# Patient Record
Sex: FEMALE | Race: WHITE | NOT HISPANIC OR LATINO | Employment: STUDENT | URBAN - METROPOLITAN AREA
[De-identification: names, ages, dates, MRNs, and addresses within clinical notes are randomized per-mention and may not be internally consistent; named-entity substitution may affect disease eponyms.]

---
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
## 2023-01-04 ENCOUNTER — PATIENT MESSAGE (OUTPATIENT)
Dept: PSYCHIATRY | Facility: CLINIC | Age: 18
End: 2023-01-04
Payer: COMMERCIAL

## 2023-01-05 ENCOUNTER — OFFICE VISIT (OUTPATIENT)
Dept: PSYCHIATRY | Facility: CLINIC | Age: 18
End: 2023-01-05
Payer: COMMERCIAL

## 2023-01-05 DIAGNOSIS — Z13.39 ADHD (ATTENTION DEFICIT HYPERACTIVITY DISORDER) EVALUATION: Primary | ICD-10-CM

## 2023-01-05 DIAGNOSIS — F90.9 ATTENTION DEFICIT HYPERACTIVITY DISORDER (ADHD), UNSPECIFIED ADHD TYPE: ICD-10-CM

## 2023-01-05 PROCEDURE — 96139 PR PSYCH/NEUROPSYCH TEST ADMIN/SCORING, BY TECH, 2+ TESTS, EA ADDTL 30 MIN: ICD-10-PCS | Mod: 95,,, | Performed by: PSYCHOLOGIST

## 2023-01-05 PROCEDURE — 96139 PSYCL/NRPSYC TST TECH EA: CPT | Mod: 95,,, | Performed by: PSYCHOLOGIST

## 2023-01-05 PROCEDURE — 90791 PSYCH DIAGNOSTIC EVALUATION: CPT | Mod: 95,,, | Performed by: PSYCHOLOGIST

## 2023-01-05 PROCEDURE — 96131 PSYCL TST EVAL PHYS/QHP EA: CPT | Mod: 95,,, | Performed by: PSYCHOLOGIST

## 2023-01-05 PROCEDURE — 90791 PR PSYCHIATRIC DIAGNOSTIC EVALUATION: ICD-10-PCS | Mod: 95,,, | Performed by: PSYCHOLOGIST

## 2023-01-05 PROCEDURE — 96138 PSYCL/NRPSYC TECH 1ST: CPT | Mod: 95,,, | Performed by: PSYCHOLOGIST

## 2023-01-05 PROCEDURE — 96138 PR PSYCH/NEUROPSYCH TEST ADMIN/SCORING, BY TECH, 2+ TESTS, 1ST 30 MIN: ICD-10-PCS | Mod: 95,,, | Performed by: PSYCHOLOGIST

## 2023-01-05 PROCEDURE — 96131 PR PSYCHOLOGIC TEST EVAL SVCS, EA ADDTL HR: ICD-10-PCS | Mod: 95,,, | Performed by: PSYCHOLOGIST

## 2023-01-05 PROCEDURE — 96130 PR PSYCHOLOGIC TEST EVAL SVCS, 1ST HR: ICD-10-PCS | Mod: 95,,, | Performed by: PSYCHOLOGIST

## 2023-01-05 PROCEDURE — 96130 PSYCL TST EVAL PHYS/QHP 1ST: CPT | Mod: 95,,, | Performed by: PSYCHOLOGIST

## 2023-01-05 NOTE — LETTER
January 10, 2023        Dept. Of Psychiatry             [facility] [address] [address]  [address]  [facility] [address]  Phone: [phone]   Patient: Laura Erazo   MR Number: [id number]   YOB: 2005   Date of Visit: 1/5/2023       Dear Dr. Gallagher:    Thank you for referring Laura Erazo to me for evaluation. Below are the relevant portions of my assessment and plan of care.    Based on this evaluation, Ms. Erazo will be assigned a diagnosis of Other Specified ADHD (or ADHD, unspecified). Her testing scores and reports indicated inconsistent evidence that is insufficient to support a full diagnosis of ADHD. However, she does appear to have symptoms related to this ADHD and has a strong family history of this disorder as well. Her current symptoms may also be exacerbated by issues in adjusting to nursing school, living situation, and distance from her friends in college. While she denied significant distress and impairment currently from this adjustment, it may be helpful to keep assessing her symptoms in this area given her past diagnosis of Adjustment Disorder.    Testing suggests that Ms. Erazo's biggest areas of concern include inattention and vigilance, which may explain her difficulty in attending to classes and studying. Because her coursework is likely to increase in difficulty and require more studying, it is recommended she consider ADHD Coaching to help her learn compensatory mechanisms to manage these symptoms effectively. 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    I defer to you to whether you will use pharmacologic treatment to manage her symptoms. If you do choose to prescribed medication, it may be prudent to start with a non-stimulant given her vulnerability to anxiety (with the aforementioned adjustments) and sensitivity to caffeine (which she reported in the interview). Ms. Erazo will be provided with a  summary of the evaluation and my recommendations for ADHD coaching.       If you have questions, please do not hesitate to call me.    Sincerely,       Suzi De Luna, PhD
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
## 2023-01-05 NOTE — LETTER
January 10, 2023    Laura Erazo  [address] [id number]             [facility] [address]  [address]  [facility] [address]  Phone: [phone] Dear Ms. Erazo:    It was a pleasure working with you for your ADHD evaluation. I have sent the full report to Dr. Gallagher, and you may work with your psychiatrist regarding future treatment planning. I will provide a summary of the evaluation to you here.    Based on your evaluation, I am assigning a diagnosis of ADHD, unspecified. There were indications of problems with inattention and vigilance (your ability to maintain performance with varying task times), but there were no indications of problems with immediate attention capacity, sustained attention, or impulsivity. Subjective data from your mother indicated symptoms related to ADHD in childhood and currently, but your self-reports indicated non-significant symptoms in childhood. Because there was a lack of consistent evidence demonstrating ADHD, a diagnosis that is unspecified or subthreshold has been assigned. You do have symptoms related to ADHD and you also have a strong family history. 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    Because your coursework is likely to increase in difficulty and require more studying, it is recommended you consider ADHD Coaching to help you learn compensatory mechanisms to manage these symptoms effectively. There are two local resources outside of [facility] ([facility] and [employer]), or you can look up other resources using [employer] or other ADHD organizations. 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 It will be up to your prescribing provider whether a pharmacologic agent is prescribed, and I will defer to Dr. Gallagher as I am not a  prescribing provider.      If you have any questions or concerns, please don't hesitate to message.      Sincerely,    Suzi De Luna, PhD

## 2023-01-10 PROBLEM — F90.9 ATTENTION-DEFICIT HYPERACTIVITY DISORDER, UNSPECIFIED TYPE: Status: ACTIVE | Noted: 2023-01-10
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
## 2023-01-19 ENCOUNTER — PATIENT MESSAGE (OUTPATIENT)
Dept: PSYCHIATRY | Facility: CLINIC | Age: 18
End: 2023-01-19
Payer: COMMERCIAL

## 2023-01-31 ENCOUNTER — OFFICE VISIT (OUTPATIENT)
Dept: PSYCHIATRY | Facility: CLINIC | Age: 18
End: 2023-01-31
Payer: COMMERCIAL

## 2023-01-31 ENCOUNTER — PATIENT MESSAGE (OUTPATIENT)
Dept: PSYCHIATRY | Facility: CLINIC | Age: 18
End: 2023-01-31
Payer: COMMERCIAL

## 2023-01-31 VITALS
SYSTOLIC BLOOD PRESSURE: 98 MMHG | HEART RATE: 65 BPM | DIASTOLIC BLOOD PRESSURE: 57 MMHG | BODY MASS INDEX: 21.71 KG/M2 | WEIGHT: 107.5 LBS

## 2023-01-31 DIAGNOSIS — F90.9 ATTENTION DEFICIT HYPERACTIVITY DISORDER (ADHD), UNSPECIFIED ADHD TYPE: Primary | ICD-10-CM

## 2023-01-31 PROCEDURE — 99214 PR OFFICE/OUTPT VISIT, EST, LEVL IV, 30-39 MIN: ICD-10-PCS | Mod: S$GLB,,, | Performed by: STUDENT IN AN ORGANIZED HEALTH CARE EDUCATION/TRAINING PROGRAM

## 2023-01-31 PROCEDURE — 99214 OFFICE O/P EST MOD 30 MIN: CPT | Mod: S$GLB,,, | Performed by: STUDENT IN AN ORGANIZED HEALTH CARE EDUCATION/TRAINING PROGRAM

## 2023-01-31 PROCEDURE — 3078F DIAST BP <80 MM HG: CPT | Mod: CPTII,S$GLB,, | Performed by: STUDENT IN AN ORGANIZED HEALTH CARE EDUCATION/TRAINING PROGRAM

## 2023-01-31 PROCEDURE — 3074F PR MOST RECENT SYSTOLIC BLOOD PRESSURE < 130 MM HG: ICD-10-PCS | Mod: CPTII,S$GLB,, | Performed by: STUDENT IN AN ORGANIZED HEALTH CARE EDUCATION/TRAINING PROGRAM

## 2023-01-31 PROCEDURE — 99999 PR PBB SHADOW E&M-EST. PATIENT-LVL II: CPT | Mod: PBBFAC,,, | Performed by: STUDENT IN AN ORGANIZED HEALTH CARE EDUCATION/TRAINING PROGRAM

## 2023-01-31 PROCEDURE — 3008F PR BODY MASS INDEX (BMI) DOCUMENTED: ICD-10-PCS | Mod: CPTII,S$GLB,, | Performed by: STUDENT IN AN ORGANIZED HEALTH CARE EDUCATION/TRAINING PROGRAM

## 2023-01-31 PROCEDURE — 3074F SYST BP LT 130 MM HG: CPT | Mod: CPTII,S$GLB,, | Performed by: STUDENT IN AN ORGANIZED HEALTH CARE EDUCATION/TRAINING PROGRAM

## 2023-01-31 PROCEDURE — 3078F PR MOST RECENT DIASTOLIC BLOOD PRESSURE < 80 MM HG: ICD-10-PCS | Mod: CPTII,S$GLB,, | Performed by: STUDENT IN AN ORGANIZED HEALTH CARE EDUCATION/TRAINING PROGRAM

## 2023-01-31 PROCEDURE — 99999 PR PBB SHADOW E&M-EST. PATIENT-LVL II: ICD-10-PCS | Mod: PBBFAC,,, | Performed by: STUDENT IN AN ORGANIZED HEALTH CARE EDUCATION/TRAINING PROGRAM

## 2023-01-31 PROCEDURE — 3008F BODY MASS INDEX DOCD: CPT | Mod: CPTII,S$GLB,, | Performed by: STUDENT IN AN ORGANIZED HEALTH CARE EDUCATION/TRAINING PROGRAM

## 2023-01-31 RX ORDER — ATOMOXETINE 40 MG/1
CAPSULE ORAL
Qty: 67 CAPSULE | Refills: 0 | Status: SHIPPED | OUTPATIENT
Start: 2023-01-31 | End: 2023-03-29 | Stop reason: ALTCHOICE

## 2023-01-31 NOTE — PROGRESS NOTES
Outpatient Psychiatry Follow-Up Visit (MD/NP)    1/31/2023    Clinical Status of Patient:  Outpatient (Ambulatory)    Chief Complaint:  Laura Erazo is a 17 y.o. female who presents today for follow-up of  ADHD Evaluation . Met with patient.      Interval History and Content of Current Session:  Interim Events/Subjective Report/Content of Current Session:   School going okay; reports increased stressors in current schedule due to nursing school clinicals  Working other job less in order to prioritize school    Got psychological testing done via [facility]. (See chart review)  Discussed results and medication options  No issues with sleep/appetite  Baseline BP somewhat low which she reports is typical but will monitor  Labs WNL from last appt.         Review of Systems   Psychiatric Review of Systems-is patient experiencing or having changes in  sleep: no  appetite: no  weight: no  energy/anergy: no  anhedonia: no  libido: did not assess  anxiety: no  guilty/hopelessness: no  concentration: yes, see chart review  Irritability: no  Paranoia/delusions: no  S.I.B.s/risky behavior: no    Medical ROS: See HPI    Past Medical, Family and Social History: The patient's past medical, family and social history have been reviewed and updated as appropriate within the electronic medical record - see encounter notes.    Compliance: N/A    Risk Parameters:  Patient reports no suicidal ideation  Patient reports no homicidal ideation  Patient reports no self-injurious behavior  Patient reports no violent behavior    Exam (detailed: at least 9 elements; comprehensive: all 15 elements)   Constitutional  Vitals:  Most recent vital signs, dated less than 90 days prior to this appointment, were reviewed.   Vitals:    01/31/23 0922   BP: (!) 98/57   Pulse: 65   Weight: 48.8 kg (107 lb 7.6 oz)        General:  unremarkable, age appropriate     Musculoskeletal  Muscle Strength/Tone:  Within normal limits   Gait & Station:  non-ataxic      Psychiatric  Speech:  no latency; no press   Mood & Affect:  euthymic  congruent and appropriate   Thought Process:  normal and logical   Associations:  intact   Thought Content:  no suicidality, no homicidality, delusions, or paranoia   Insight:  intact, has awareness of illness   Judgement: behavior is adequate to circumstances   Orientation:  grossly intact   Memory: intact for content of interview   Language: grossly intact   Attention Span & Concentration:  able to focus   Fund of Knowledge:  intact and appropriate to age and level of education     Assessment and Diagnosis   17 y.o. female presenting for medication management and follow up s/p Psychological testing for focus and concentration issues.       General Impression:    ICD-10-CM ICD-9-CM   1. Attention deficit hyperactivity disorder (ADHD), unspecified ADHD type  F90.9 314.01       Intervention/Counseling/Treatment Plan   Psych Meds:  Start Straterra 40mg x 1 week then 80mg daily  Will monitor mood/adjustment disorder as discussed in Psychology evaluation     Labs/Vitals:  WNL on last visit orders 11/2023      LAPMP Reviewed, no concerns    Other  - Discussed diagnosis, risks and benefits of proposed treatment vs alternative treatments vs no treatment, inherent unpredictability of medications and the potential side effects of these treatments specifically for: SNRI's, including but not limited to GI side effects, sexual dysfunction, psychomotor activation vs. somnolence, hypertension, tachycardia, urinary retention, dry mouth and constipation.     -Discussed avoiding pregnancy while taking psychiatric medication. Aware she should make an appointment and discuss discontinuing medication if concerns for pregnancy or decision is made to attempt to conceive.      Instructions:  Take all medications as prescribed.    Abstain from recreational drugs and alcohol.  Present to ED or call 911 for SI/HI plan or intent, psychosis, or medical  emergency.    Return to Clinic:  6 weeks      Anjana (VianeyRenetta Gallagher MD   Psychiatry: PGY-III Resident    [facility]

## 2023-02-15 NOTE — PROGRESS NOTES
Staff Note:     Case discussed.  I agree with Resident's findings and treatment plan.  Pt opted for trial of Strattera after discussion of potential adverse effects.    [place]

## 2023-03-06 ENCOUNTER — PATIENT MESSAGE (OUTPATIENT)
Dept: PSYCHIATRY | Facility: CLINIC | Age: 18
End: 2023-03-06
Payer: COMMERCIAL

## 2023-03-29 ENCOUNTER — OFFICE VISIT (OUTPATIENT)
Dept: PSYCHIATRY | Facility: CLINIC | Age: 18
End: 2023-03-29
Payer: COMMERCIAL

## 2023-03-29 VITALS
HEART RATE: 72 BPM | BODY MASS INDEX: 21.91 KG/M2 | SYSTOLIC BLOOD PRESSURE: 105 MMHG | WEIGHT: 108.44 LBS | DIASTOLIC BLOOD PRESSURE: 63 MMHG

## 2023-03-29 DIAGNOSIS — F90.9 ATTENTION DEFICIT HYPERACTIVITY DISORDER (ADHD), UNSPECIFIED ADHD TYPE: Primary | ICD-10-CM

## 2023-03-29 PROCEDURE — 99999 PR PBB SHADOW E&M-EST. PATIENT-LVL II: CPT | Mod: PBBFAC,,, | Performed by: STUDENT IN AN ORGANIZED HEALTH CARE EDUCATION/TRAINING PROGRAM

## 2023-03-29 PROCEDURE — 3078F PR MOST RECENT DIASTOLIC BLOOD PRESSURE < 80 MM HG: ICD-10-PCS | Mod: CPTII,S$GLB,, | Performed by: STUDENT IN AN ORGANIZED HEALTH CARE EDUCATION/TRAINING PROGRAM

## 2023-03-29 PROCEDURE — 99214 OFFICE O/P EST MOD 30 MIN: CPT | Mod: S$GLB,,, | Performed by: STUDENT IN AN ORGANIZED HEALTH CARE EDUCATION/TRAINING PROGRAM

## 2023-03-29 PROCEDURE — 3008F BODY MASS INDEX DOCD: CPT | Mod: CPTII,S$GLB,, | Performed by: STUDENT IN AN ORGANIZED HEALTH CARE EDUCATION/TRAINING PROGRAM

## 2023-03-29 PROCEDURE — 99999 PR PBB SHADOW E&M-EST. PATIENT-LVL II: ICD-10-PCS | Mod: PBBFAC,,, | Performed by: STUDENT IN AN ORGANIZED HEALTH CARE EDUCATION/TRAINING PROGRAM

## 2023-03-29 PROCEDURE — 99214 PR OFFICE/OUTPT VISIT, EST, LEVL IV, 30-39 MIN: ICD-10-PCS | Mod: S$GLB,,, | Performed by: STUDENT IN AN ORGANIZED HEALTH CARE EDUCATION/TRAINING PROGRAM

## 2023-03-29 PROCEDURE — 3074F PR MOST RECENT SYSTOLIC BLOOD PRESSURE < 130 MM HG: ICD-10-PCS | Mod: CPTII,S$GLB,, | Performed by: STUDENT IN AN ORGANIZED HEALTH CARE EDUCATION/TRAINING PROGRAM

## 2023-03-29 PROCEDURE — 3008F PR BODY MASS INDEX (BMI) DOCUMENTED: ICD-10-PCS | Mod: CPTII,S$GLB,, | Performed by: STUDENT IN AN ORGANIZED HEALTH CARE EDUCATION/TRAINING PROGRAM

## 2023-03-29 PROCEDURE — 3078F DIAST BP <80 MM HG: CPT | Mod: CPTII,S$GLB,, | Performed by: STUDENT IN AN ORGANIZED HEALTH CARE EDUCATION/TRAINING PROGRAM

## 2023-03-29 PROCEDURE — 3074F SYST BP LT 130 MM HG: CPT | Mod: CPTII,S$GLB,, | Performed by: STUDENT IN AN ORGANIZED HEALTH CARE EDUCATION/TRAINING PROGRAM

## 2023-03-29 RX ORDER — DEXTROAMPHETAMINE SACCHARATE, AMPHETAMINE ASPARTATE MONOHYDRATE, DEXTROAMPHETAMINE SULFATE AND AMPHETAMINE SULFATE 2.5; 2.5; 2.5; 2.5 MG/1; MG/1; MG/1; MG/1
10 CAPSULE, EXTENDED RELEASE ORAL DAILY
Qty: 30 CAPSULE | Refills: 0 | Status: SHIPPED | OUTPATIENT
Start: 2023-03-29 | End: 2023-04-26 | Stop reason: SDUPTHER

## 2023-03-29 NOTE — PROGRESS NOTES
Outpatient Psychiatry Follow-Up Visit (MD/NP)    3/29/2023    Clinical Status of Patient:  Outpatient (Ambulatory)    Chief Complaint:  Laura Erazo is a 17 y.o. female who presents today for follow-up of  ADHD Evaluation . Met with patient.      Interval History and Content of Current Session:  Interim Events/Subjective Report/Content of Current Session:   Of note, patient last seen in February. Took Straterra 40mg then 80mg x 1 month   Helped somewhat with motivation and focus but caused constipation and has d/c    Discussed additional medication options; specifically stimulants   Discussed risks and benefits and clinic protocol     School clinicals are increased; trying to balance studying   Sleep--fluctuates based on daily clinical schedule   Appetite--increased vegetable intake  No changes in tobacco or alcohol use; no recreational drug use     No SI, HI, or AVH    Review of Systems   Psychiatric Review of Systems-is patient experiencing or having changes in  sleep: no  appetite: no  weight: no  energy/anergy: no  anhedonia: no  libido: did not assess  anxiety: no  guilty/hopelessness: no  concentration: yes, see chart review  Irritability: no  Paranoia/delusions: no  S.I.B.s/risky behavior: no    Medical ROS: See HPI    Past Medical, Family and Social History: The patient's past medical, family and social history have been reviewed and updated as appropriate within the electronic medical record - see encounter notes.    Current Meds: None  Compliance: N/A    Previous Medications/Side effects   - Straterra: caused constipation       Risk Parameters:  Patient reports no suicidal ideation  Patient reports no homicidal ideation  Patient reports no self-injurious behavior  Patient reports no violent behavior    Exam (detailed: at least 9 elements; comprehensive: all 15 elements)   Constitutional  Vitals:  Most recent vital signs, dated less than 90 days prior to this appointment, were reviewed.   Vitals:     03/29/23 0818   BP: 105/63   Pulse: 72   Weight: 49.2 kg (108 lb 7.5 oz)        General:  unremarkable, age appropriate     Musculoskeletal  Muscle Strength/Tone:  Within normal limits   Gait & Station:  non-ataxic     Psychiatric  Speech:  no latency; no press   Mood & Affect:  euthymic  congruent and appropriate   Thought Process:  normal and logical   Associations:  intact   Thought Content:  no suicidality, no homicidality, delusions, or paranoia   Insight:  intact, has awareness of illness   Judgement: behavior is adequate to circumstances   Orientation:  grossly intact   Memory: intact for content of interview   Language: grossly intact   Attention Span & Concentration:  able to focus   Fund of Knowledge:  intact and appropriate to age and level of education     Assessment and Diagnosis   17 y.o. female with ADHD presenting for medication management and follow up s/p Psychological testing for focus and concentration issues.       General Impression:    ICD-10-CM ICD-9-CM   1. Attention deficit hyperactivity disorder (ADHD), unspecified ADHD type  F90.9 314.01         Intervention/Counseling/Treatment Plan   Psych Meds:  Discontinue Straterra  Start Adderall 10mg XR daily  Discussed below risks of stimulant with patient    Increased risk for heart problems, high blood pressure, and sudden cardiac death.   Decreased appetite. People seem to be less hungry during the middle of the day, but they are often hungry by dinnertime as the medication wears off.   Sleep problems. If a person cannot fall asleep, the doctor may prescribe a lower dose. The doctor might also suggest that the medication is taken earlier in the day, or stop the afternoon or evening dose.    Stomach aches and headaches.   Tics. A few people develop sudden, repetitive movements or sounds called tics. These tics may or may not be noticeable. Changing the medication dosage may make tics go away. Some people also may appear to have a personality  change, such as appearing flat or without emotion. Talk with your doctor if you see any of these side effects.  Stimulant medications may lead to drug abuse or dependence, but the risk is highest for those patient taking this class of medication who do not have ADHD. Research shows that teens with ADHD who took stimulant medications were less likely to abuse drugs than those who did not take stimulant medications. Proper diagnosis is the key to minimizing this risk.  Discussed with female patients that they will need to be off of stimulant if trying to become pregnant or become pregnant.    Will monitor mood/adjustment disorder as discussed in Psychology evaluation     Labs/Vitals:  WNL on last visit orders 11/2023      LAPMP Reviewed, no concerns    Other  Instructions:  Take all medications as prescribed.    Abstain from recreational drugs and alcohol.  Present to ED or call 911 for SI/HI plan or intent, psychosis, or medical emergency.  -Discussed avoiding pregnancy while taking psychiatric medication. Aware she should make an appointment and discuss discontinuing medication if concerns for pregnancy or decision is made to attempt to conceive.        Return to Clinic:  4 weeks      Anjana LopezVianeyRenetta Gallagher MD   Psychiatry: PGY-III Resident    [facility]

## 2023-03-29 NOTE — PROGRESS NOTES
Staff Note:     Case discussed.  I agree with Resident's findings and treatment plan.  She was unable to tolerate Strattera.  Adderall is warranted based on symptoms and history.    JU

## 2023-04-26 ENCOUNTER — OFFICE VISIT (OUTPATIENT)
Dept: PSYCHIATRY | Facility: CLINIC | Age: 18
End: 2023-04-26
Payer: COMMERCIAL

## 2023-04-26 VITALS
BODY MASS INDEX: 21.46 KG/M2 | SYSTOLIC BLOOD PRESSURE: 113 MMHG | HEART RATE: 80 BPM | DIASTOLIC BLOOD PRESSURE: 77 MMHG | WEIGHT: 106.25 LBS

## 2023-04-26 DIAGNOSIS — F90.9 ATTENTION DEFICIT HYPERACTIVITY DISORDER (ADHD), UNSPECIFIED ADHD TYPE: ICD-10-CM

## 2023-04-26 PROCEDURE — 99214 PR OFFICE/OUTPT VISIT, EST, LEVL IV, 30-39 MIN: ICD-10-PCS | Mod: S$GLB,,, | Performed by: STUDENT IN AN ORGANIZED HEALTH CARE EDUCATION/TRAINING PROGRAM

## 2023-04-26 PROCEDURE — 99999 PR PBB SHADOW E&M-EST. PATIENT-LVL II: ICD-10-PCS | Mod: PBBFAC,,, | Performed by: STUDENT IN AN ORGANIZED HEALTH CARE EDUCATION/TRAINING PROGRAM

## 2023-04-26 PROCEDURE — 99214 OFFICE O/P EST MOD 30 MIN: CPT | Mod: S$GLB,,, | Performed by: STUDENT IN AN ORGANIZED HEALTH CARE EDUCATION/TRAINING PROGRAM

## 2023-04-26 PROCEDURE — 3078F DIAST BP <80 MM HG: CPT | Mod: CPTII,S$GLB,, | Performed by: STUDENT IN AN ORGANIZED HEALTH CARE EDUCATION/TRAINING PROGRAM

## 2023-04-26 PROCEDURE — 3078F PR MOST RECENT DIASTOLIC BLOOD PRESSURE < 80 MM HG: ICD-10-PCS | Mod: CPTII,S$GLB,, | Performed by: STUDENT IN AN ORGANIZED HEALTH CARE EDUCATION/TRAINING PROGRAM

## 2023-04-26 PROCEDURE — 3074F PR MOST RECENT SYSTOLIC BLOOD PRESSURE < 130 MM HG: ICD-10-PCS | Mod: CPTII,S$GLB,, | Performed by: STUDENT IN AN ORGANIZED HEALTH CARE EDUCATION/TRAINING PROGRAM

## 2023-04-26 PROCEDURE — 3074F SYST BP LT 130 MM HG: CPT | Mod: CPTII,S$GLB,, | Performed by: STUDENT IN AN ORGANIZED HEALTH CARE EDUCATION/TRAINING PROGRAM

## 2023-04-26 PROCEDURE — 3008F PR BODY MASS INDEX (BMI) DOCUMENTED: ICD-10-PCS | Mod: CPTII,S$GLB,, | Performed by: STUDENT IN AN ORGANIZED HEALTH CARE EDUCATION/TRAINING PROGRAM

## 2023-04-26 PROCEDURE — 99999 PR PBB SHADOW E&M-EST. PATIENT-LVL II: CPT | Mod: PBBFAC,,, | Performed by: STUDENT IN AN ORGANIZED HEALTH CARE EDUCATION/TRAINING PROGRAM

## 2023-04-26 PROCEDURE — 3008F BODY MASS INDEX DOCD: CPT | Mod: CPTII,S$GLB,, | Performed by: STUDENT IN AN ORGANIZED HEALTH CARE EDUCATION/TRAINING PROGRAM

## 2023-04-26 RX ORDER — DEXTROAMPHETAMINE SACCHARATE, AMPHETAMINE ASPARTATE MONOHYDRATE, DEXTROAMPHETAMINE SULFATE AND AMPHETAMINE SULFATE 5; 5; 5; 5 MG/1; MG/1; MG/1; MG/1
20 CAPSULE, EXTENDED RELEASE ORAL DAILY
Qty: 30 CAPSULE | Refills: 0 | Status: SHIPPED | OUTPATIENT
Start: 2023-04-26 | End: 2023-05-31 | Stop reason: SDUPTHER

## 2023-04-26 NOTE — PROGRESS NOTES
Outpatient Psychiatry Follow-Up Visit (MD/NP)    4/26/2023    Clinical Status of Patient:  Outpatient (Ambulatory)    Chief Complaint:  Laura Erazo is a 17 y.o. female who presents today for follow-up of  ADHD Evaluation . Met with patient.      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     No changes in tobacco or alcohol use; no recreational drug use     No SI, HI, or AVH    Review of Systems   Psychiatric Review of Systems-is patient experiencing or having changes in  sleep: no  appetite: no  weight: no  energy/anergy: no  anhedonia: no  libido: did not assess  anxiety: no  guilty/hopelessness: no  concentration: yes, see chart review  Irritability: no  Paranoia/delusions: no  S.I.B.s/risky behavior: no    Medical ROS: See HPI    Past Medical, Family and Social History: The patient's past medical, family and social history have been reviewed and updated as appropriate within the electronic medical record - see encounter notes.    Current Meds: Adderall 10 mg XR  Compliance: N/A    Previous Medications/Side effects   - Straterra: caused constipation       Risk Parameters:  Patient reports no suicidal ideation  Patient reports no homicidal ideation  Patient reports no self-injurious behavior  Patient reports no violent behavior    Exam (detailed: at least 9 elements; comprehensive: all 15 elements)   Constitutional  Vitals:  Most recent vital signs, dated less than 90 days prior to this appointment, were reviewed.   Vitals:    04/26/23 1006   BP: 113/77   Pulse: 80   Weight: 48.2 kg (106 lb 4.2 oz)        General:  unremarkable, age  appropriate     Musculoskeletal  Muscle Strength/Tone:  Within normal limits   Gait & Station:  non-ataxic     Psychiatric  Speech:  no latency; no press   Mood & Affect:  euthymic  congruent and appropriate   Thought Process:  normal and logical   Associations:  intact   Thought Content:  no suicidality, no homicidality, delusions, or paranoia   Insight:  intact, has awareness of illness   Judgement: behavior is adequate to circumstances   Orientation:  grossly intact   Memory: intact for content of interview   Language: grossly intact   Attention Span & Concentration:  able to focus   Fund of Knowledge:  intact and appropriate to age and level of education     Assessment and Diagnosis   17 y.o. female with ADHD presenting for medication management and follow up s/p Psychological testing for focus and concentration issues.   Nov 2022: Initial Visit--discussed ADHD testing  January: 2023: Started Straterra  March 2023: Minimal improvement with Strattera and side effects; initiated Adderall XR  April 2023: Improvement with Adderall XR 10mg, will increase dose       General Impression:    ICD-10-CM ICD-9-CM   1. Attention deficit hyperactivity disorder (ADHD), unspecified ADHD type  F90.9 314.01           Intervention/Counseling/Treatment Plan   Psych Meds:  Increase Adderall to 20mg XR daily  Discussed below risks of stimulant with patient    Increased risk for heart problems, high blood pressure, and sudden cardiac death.   Decreased appetite. People seem to be less hungry during the middle of the day, but they are often hungry by dinnertime as the medication wears off.   Sleep problems. If a person cannot fall asleep, the doctor may prescribe a lower dose. The doctor might also suggest that the medication is taken earlier in the day, or stop the afternoon or evening dose.    Stomach aches and headaches.   Tics. A few people develop sudden, repetitive movements or sounds called tics. These tics may or may not be  noticeable. Changing the medication dosage may make tics go away. Some people also may appear to have a personality change, such as appearing flat or without emotion. Talk with your doctor if you see any of these side effects.  Stimulant medications may lead to drug abuse or dependence, but the risk is highest for those patient taking this class of medication who do not have ADHD. Research shows that teens with ADHD who took stimulant medications were less likely to abuse drugs than those who did not take stimulant medications. Proper diagnosis is the key to minimizing this risk.  Discussed with female patients that they will need to be off of stimulant if trying to become pregnant or become pregnant.    Will monitor mood/adjustment disorder as discussed in Psychology evaluation     Labs/Vitals:  WNL on last visit orders 11/2023      LAPMP Reviewed, no concerns    Other  Instructions:  Take all medications as prescribed.    Abstain from recreational drugs and alcohol.  Present to ED or call 911 for SI/HI plan or intent, psychosis, or medical emergency.  -Discussed avoiding pregnancy while taking psychiatric medication. Aware she should make an appointment and discuss discontinuing medication if concerns for pregnancy or decision is made to attempt to conceive.        Return to Clinic:  4 weeks      Anjana HusainechiRenetta Gallagher MD   Psychiatry: PGY-III Resident    [facility]

## 2023-05-30 ENCOUNTER — OFFICE VISIT (OUTPATIENT)
Dept: PSYCHIATRY | Facility: CLINIC | Age: 18
End: 2023-05-30
Payer: COMMERCIAL

## 2023-05-30 VITALS
DIASTOLIC BLOOD PRESSURE: 72 MMHG | SYSTOLIC BLOOD PRESSURE: 121 MMHG | HEART RATE: 96 BPM | BODY MASS INDEX: 21.35 KG/M2 | WEIGHT: 105.69 LBS

## 2023-05-30 DIAGNOSIS — F90.9 ATTENTION DEFICIT HYPERACTIVITY DISORDER (ADHD), UNSPECIFIED ADHD TYPE: ICD-10-CM

## 2023-05-30 PROCEDURE — 3078F PR MOST RECENT DIASTOLIC BLOOD PRESSURE < 80 MM HG: ICD-10-PCS | Mod: CPTII,S$GLB,, | Performed by: STUDENT IN AN ORGANIZED HEALTH CARE EDUCATION/TRAINING PROGRAM

## 2023-05-30 PROCEDURE — 3074F SYST BP LT 130 MM HG: CPT | Mod: CPTII,S$GLB,, | Performed by: STUDENT IN AN ORGANIZED HEALTH CARE EDUCATION/TRAINING PROGRAM

## 2023-05-30 PROCEDURE — 3008F BODY MASS INDEX DOCD: CPT | Mod: CPTII,S$GLB,, | Performed by: STUDENT IN AN ORGANIZED HEALTH CARE EDUCATION/TRAINING PROGRAM

## 2023-05-30 PROCEDURE — 99999 PR PBB SHADOW E&M-EST. PATIENT-LVL II: CPT | Mod: PBBFAC,,, | Performed by: STUDENT IN AN ORGANIZED HEALTH CARE EDUCATION/TRAINING PROGRAM

## 2023-05-30 PROCEDURE — 3074F PR MOST RECENT SYSTOLIC BLOOD PRESSURE < 130 MM HG: ICD-10-PCS | Mod: CPTII,S$GLB,, | Performed by: STUDENT IN AN ORGANIZED HEALTH CARE EDUCATION/TRAINING PROGRAM

## 2023-05-30 PROCEDURE — 99213 PR OFFICE/OUTPT VISIT, EST, LEVL III, 20-29 MIN: ICD-10-PCS | Mod: S$GLB,,, | Performed by: STUDENT IN AN ORGANIZED HEALTH CARE EDUCATION/TRAINING PROGRAM

## 2023-05-30 PROCEDURE — 3078F DIAST BP <80 MM HG: CPT | Mod: CPTII,S$GLB,, | Performed by: STUDENT IN AN ORGANIZED HEALTH CARE EDUCATION/TRAINING PROGRAM

## 2023-05-30 PROCEDURE — 99213 OFFICE O/P EST LOW 20 MIN: CPT | Mod: S$GLB,,, | Performed by: STUDENT IN AN ORGANIZED HEALTH CARE EDUCATION/TRAINING PROGRAM

## 2023-05-30 PROCEDURE — 99999 PR PBB SHADOW E&M-EST. PATIENT-LVL II: ICD-10-PCS | Mod: PBBFAC,,, | Performed by: STUDENT IN AN ORGANIZED HEALTH CARE EDUCATION/TRAINING PROGRAM

## 2023-05-30 PROCEDURE — 3008F PR BODY MASS INDEX (BMI) DOCUMENTED: ICD-10-PCS | Mod: CPTII,S$GLB,, | Performed by: STUDENT IN AN ORGANIZED HEALTH CARE EDUCATION/TRAINING PROGRAM

## 2023-05-30 NOTE — PROGRESS NOTES
C/o on and off headache-worst this week. Dizziness and vomiting last night. Denies of head injury. Outpatient Psychiatry Follow-Up Visit (MD/NP)    5/30/2023    Clinical Status of Patient:  Outpatient (Ambulatory)    Chief Complaint:  Laura Erazo is a 17 y.o. female who presents today for follow-up of  ADHD Evaluation . Met with patient.      Interval History and Content of Current Session:  Interim Events/Subjective Report/Content of Current Session:   Patient arrived 20 minutes late to appt--abbreviated visit performed.   Compliant Increased Adderall XR 20mg dose daily without issues  Notes it was helpful for finishing semester and now with work--has noted it lasts longer through day    No issues with sleep/appetite   No new medications, vitamins since last visit   No changes in tobacco or alcohol use; no recreational drug use   No SI, HI, or AVH  Reviewed protocols of controlled medication use and pharmacy/clinic policy     Review of Systems   Psychiatric Review of Systems-is patient experiencing or having changes in  sleep: no  appetite: no  weight: no  energy/anergy: no  anhedonia: no  libido: did not assess  anxiety: no  guilty/hopelessness: no  concentration: yes, see chart review  Irritability: no  Paranoia/delusions: no  S.I.B.s/risky behavior: no    Medical ROS: See HPI    Past Medical, Family and Social History: The patient's past medical, family and social history have been reviewed and updated as appropriate within the electronic medical record - see encounter notes.    Current Meds: Adderall 20 mg XR  Compliance: N/A    Previous Medications/Side effects   - Straterra: caused constipation         Risk Parameters:  Patient reports no suicidal ideation  Patient reports no homicidal ideation  Patient reports no self-injurious behavior  Patient reports no violent behavior    Exam (detailed: at least 9 elements; comprehensive: all 15 elements)   Constitutional  Vitals:  Most recent vital signs, dated less than 90 days prior to this appointment, were reviewed.   Vitals:    05/30/23 0925   BP:  121/72   Pulse: 96   Weight: 48 kg (105 lb 11.4 oz)        General:  unremarkable, age appropriate     Musculoskeletal  Muscle Strength/Tone:  Within normal limits   Gait & Station:  non-ataxic     Psychiatric  Speech:  no latency; no press   Mood & Affect:  euthymic  congruent and appropriate   Thought Process:  normal and logical   Associations:  intact   Thought Content:  no suicidality, no homicidality, delusions, or paranoia   Insight:  intact, has awareness of illness   Judgement: behavior is adequate to circumstances   Orientation:  grossly intact   Memory: intact for content of interview   Language: grossly intact   Attention Span & Concentration:  able to focus   Fund of Knowledge:  intact and appropriate to age and level of education     Assessment and Diagnosis   17 y.o. female with ADHD presenting for medication management and follow up s/p Psychological testing for focus and concentration issues.   -Nov 2022: Initial Visit--discussed ADHD testing.  -January: 2023: Started Straterra trial  -March 2023: Minimal improvement with Strattera plus side effects-discontinued; initiated Adderall XR.   -April 2023: Improvement with Adderall XR 10mg, will increase dose   -May 2023: Patient feels comfortable with current Adderall XR 20mg dose without issues      General Impression:    ICD-10-CM ICD-9-CM   1. Attention deficit hyperactivity disorder (ADHD), unspecified ADHD type  F90.9 314.01             Intervention/Counseling/Treatment Plan   Psych Meds:  Continue Adderall 20mg XR daily  Discussed below risks of stimulant with patient    Increased risk for heart problems, high blood pressure, and sudden cardiac death.   Decreased appetite. People seem to be less hungry during the middle of the day, but they are often hungry by dinnertime as the medication wears off.   Sleep problems. If a person cannot fall asleep, the doctor may prescribe a lower dose. The doctor might also suggest that the medication is taken  earlier in the day, or stop the afternoon or evening dose.    Stomach aches and headaches.   Tics. A few people develop sudden, repetitive movements or sounds called tics. These tics may or may not be noticeable. Changing the medication dosage may make tics go away. Some people also may appear to have a personality change, such as appearing flat or without emotion. Talk with your doctor if you see any of these side effects.  Stimulant medications may lead to drug abuse or dependence, but the risk is highest for those patient taking this class of medication who do not have ADHD. Research shows that teens with ADHD who took stimulant medications were less likely to abuse drugs than those who did not take stimulant medications. Proper diagnosis is the key to minimizing this risk.  Discussed with female patients that they will need to be off of stimulant if trying to become pregnant or become pregnant.    Will monitor mood/adjustment disorder as discussed in Psychology evaluation     Labs/Vitals:  WNL on last visit orders 11/2023      LAPMP Reviewed, no concerns    Other  Instructions:  Take all medications as prescribed.    Abstain from recreational drugs and alcohol.  Present to ED or call 911 for SI/HI plan or intent, psychosis, or medical emergency.    -Discussed avoiding pregnancy while taking psychiatric medication. Aware she should make an appointment and discuss discontinuing medication if concerns for pregnancy or decision is made to attempt to conceive.        Return to Clinic:  2-3 months      Anjana Gallagher MD ([place])   Psychiatry: PGY-III Resident    [facility]

## 2023-05-31 ENCOUNTER — PATIENT MESSAGE (OUTPATIENT)
Dept: PSYCHIATRY | Facility: CLINIC | Age: 18
End: 2023-05-31
Payer: COMMERCIAL

## 2023-05-31 RX ORDER — DEXTROAMPHETAMINE SACCHARATE, AMPHETAMINE ASPARTATE MONOHYDRATE, DEXTROAMPHETAMINE SULFATE AND AMPHETAMINE SULFATE 5; 5; 5; 5 MG/1; MG/1; MG/1; MG/1
20 CAPSULE, EXTENDED RELEASE ORAL DAILY
Qty: 30 CAPSULE | Refills: 0 | Status: SHIPPED | OUTPATIENT
Start: 2023-06-30 | End: 2023-08-09

## 2023-05-31 RX ORDER — DEXTROAMPHETAMINE SACCHARATE, AMPHETAMINE ASPARTATE MONOHYDRATE, DEXTROAMPHETAMINE SULFATE AND AMPHETAMINE SULFATE 5; 5; 5; 5 MG/1; MG/1; MG/1; MG/1
20 CAPSULE, EXTENDED RELEASE ORAL DAILY
Qty: 30 CAPSULE | Refills: 0 | Status: SHIPPED | OUTPATIENT
Start: 2023-07-30 | End: 2023-09-13 | Stop reason: SDUPTHER

## 2023-05-31 RX ORDER — DEXTROAMPHETAMINE SACCHARATE, AMPHETAMINE ASPARTATE MONOHYDRATE, DEXTROAMPHETAMINE SULFATE AND AMPHETAMINE SULFATE 5; 5; 5; 5 MG/1; MG/1; MG/1; MG/1
20 CAPSULE, EXTENDED RELEASE ORAL DAILY
Qty: 30 CAPSULE | Refills: 0 | Status: SHIPPED | OUTPATIENT
Start: 2023-05-31 | End: 2023-07-01

## 2023-07-07 NOTE — PROGRESS NOTES
[facility] Note    Chief Complaint      Chief Complaint   Patient presents with    school TB test requirement       History of Present Illness      Laura Erazo is a 17 y.o. 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 She would like to get a result ASAP, therefore, I will order the quantiferon gold test. Explained to patient the difference between TB skin test and Quantiferon gold test. Patient verbalizes understanding.       Review of Systems   All 12 systems otherwise negative.     Family History:  family history includes Cancer in her maternal grandfather; Heart disease in her maternal grandmother and paternal grandfather; Hyperlipidemia in her maternal grandfather; No Known Problems in her father and mother; Thyroid disease in her maternal grandmother.   Family history was reviewed with patient.     Medications:  Outpatient Encounter Medications as of 7/13/2023   Medication Sig Dispense Refill    [START ON 7/30/2023] dextroamphetamine-amphetamine (ADDERALL XR) 20 MG 24 hr capsule Take 1 capsule (20 mg total) by mouth once daily. 30 capsule 0    dextroamphetamine-amphetamine (ADDERALL XR) 20 MG 24 hr capsule Take 1 capsule (20 mg total) by mouth once daily. 30 capsule 0    levalbuterol (XOPENEX) 0.63 mg/3 mL nebulizer solution Take 1 ampule by nebulization every 4 (four) hours as needed.      levonorgestrel-ethinyl estradiol (AVIANE,ALESSE,LESSINA) 0.1-20 mg-mcg per tablet Take 1 tablet by mouth once daily. 28 tablet 11     No facility-administered encounter medications on file as of 7/13/2023.       Allergies:  Review of patient's allergies indicates:  No Known Allergies    Health Maintenance:  Health Maintenance   Topic Date Due    Hepatitis C Screening  Never done    Chlamydia Screening  07/14/2022    TETANUS VACCINE  08/11/2024     "DTaP/Tdap/Td Vaccines (7 - Td or Tdap) 08/11/2024    Hib Vaccines  Completed    Hepatitis B Vaccines  Completed    IPV Vaccines  Completed    Lipid Panel  Completed    Hepatitis A Vaccines  Completed    MMR Vaccines  Completed    Varicella Vaccines  Completed    Meningococcal Vaccine  Completed    HPV Vaccines  Completed     Health Maintenance Topics with due status: Not Due       Topic Last Completion Date    TETANUS VACCINE 08/11/2014    DTaP/Tdap/Td Vaccines 08/11/2014    Influenza Vaccine 10/31/2022       Physical Exam      Vital Signs  Temp: 98 °F (36.7 °C)  Temp Source: Oral  Pulse: 77  SpO2: 97 %  BP: 110/68  BP Location: Right arm  Patient Position: Sitting  Pain Score: 0-No pain  Height and Weight  Height: 4' 11" (149.9 cm)  Weight: 48 kg (105 lb 13.1 oz)  BSA (Calculated - sq m): 1.41 sq meters  BMI (Calculated): 21.4  Weight in (lb) to have BMI = 25: 123.5]    Physical Exam       Assessment/Plan     Laura Erazo is a 17 y.o.female with:    Screening for tuberculosis  -     Quantiferon Gold TB; Future; Expected date: 07/13/2023        As above, continue current medications and maintain follow up with specialists.  Return to clinic as needed.    Greater than 50% of visit was spent face to face with patient.  All questions were answered to patient's satisfaction.            Karen L Spencer, NP-C [facility]                  "

## 2023-07-13 ENCOUNTER — LAB VISIT (OUTPATIENT)
Dept: LAB | Facility: HOSPITAL | Age: 18
End: 2023-07-13
Attending: NURSE PRACTITIONER
Payer: COMMERCIAL

## 2023-07-13 ENCOUNTER — OFFICE VISIT (OUTPATIENT)
Dept: PRIMARY CARE CLINIC | Facility: CLINIC | Age: 18
End: 2023-07-13
Payer: COMMERCIAL

## 2023-07-13 VITALS
SYSTOLIC BLOOD PRESSURE: 110 MMHG | DIASTOLIC BLOOD PRESSURE: 68 MMHG | HEIGHT: 59 IN | TEMPERATURE: 98 F | HEART RATE: 77 BPM | BODY MASS INDEX: 21.33 KG/M2 | WEIGHT: 105.81 LBS | OXYGEN SATURATION: 97 %

## 2023-07-13 DIAGNOSIS — Z11.1 SCREENING FOR TUBERCULOSIS: ICD-10-CM

## 2023-07-13 DIAGNOSIS — Z11.1 SCREENING FOR TUBERCULOSIS: Primary | ICD-10-CM

## 2023-07-13 PROCEDURE — 3074F SYST BP LT 130 MM HG: CPT | Mod: CPTII,S$GLB,, | Performed by: NURSE PRACTITIONER

## 2023-07-13 PROCEDURE — 3078F PR MOST RECENT DIASTOLIC BLOOD PRESSURE < 80 MM HG: ICD-10-PCS | Mod: CPTII,S$GLB,, | Performed by: NURSE PRACTITIONER

## 2023-07-13 PROCEDURE — 1159F MED LIST DOCD IN RCRD: CPT | Mod: CPTII,S$GLB,, | Performed by: NURSE PRACTITIONER

## 2023-07-13 PROCEDURE — 1160F RVW MEDS BY RX/DR IN RCRD: CPT | Mod: CPTII,S$GLB,, | Performed by: NURSE PRACTITIONER

## 2023-07-13 PROCEDURE — 99999 PR PBB SHADOW E&M-EST. PATIENT-LVL IV: CPT | Mod: PBBFAC,,, | Performed by: NURSE PRACTITIONER

## 2023-07-13 PROCEDURE — 1159F PR MEDICATION LIST DOCUMENTED IN MEDICAL RECORD: ICD-10-PCS | Mod: CPTII,S$GLB,, | Performed by: NURSE PRACTITIONER

## 2023-07-13 PROCEDURE — 99203 OFFICE O/P NEW LOW 30 MIN: CPT | Mod: S$GLB,,, | Performed by: NURSE PRACTITIONER

## 2023-07-13 PROCEDURE — 99999 PR PBB SHADOW E&M-EST. PATIENT-LVL IV: ICD-10-PCS | Mod: PBBFAC,,, | Performed by: NURSE PRACTITIONER

## 2023-07-13 PROCEDURE — 3008F PR BODY MASS INDEX (BMI) DOCUMENTED: ICD-10-PCS | Mod: CPTII,S$GLB,, | Performed by: NURSE PRACTITIONER

## 2023-07-13 PROCEDURE — 3074F PR MOST RECENT SYSTOLIC BLOOD PRESSURE < 130 MM HG: ICD-10-PCS | Mod: CPTII,S$GLB,, | Performed by: NURSE PRACTITIONER

## 2023-07-13 PROCEDURE — 1160F PR REVIEW ALL MEDS BY PRESCRIBER/CLIN PHARMACIST DOCUMENTED: ICD-10-PCS | Mod: CPTII,S$GLB,, | Performed by: NURSE PRACTITIONER

## 2023-07-13 PROCEDURE — 86480 TB TEST CELL IMMUN MEASURE: CPT | Performed by: NURSE PRACTITIONER

## 2023-07-13 PROCEDURE — 99203 PR OFFICE/OUTPT VISIT, NEW, LEVL III, 30-44 MIN: ICD-10-PCS | Mod: S$GLB,,, | Performed by: NURSE PRACTITIONER

## 2023-07-13 PROCEDURE — 3078F DIAST BP <80 MM HG: CPT | Mod: CPTII,S$GLB,, | Performed by: NURSE PRACTITIONER

## 2023-07-13 PROCEDURE — 3008F BODY MASS INDEX DOCD: CPT | Mod: CPTII,S$GLB,, | Performed by: NURSE PRACTITIONER

## 2023-07-15 LAB
GAMMA INTERFERON BACKGROUND BLD IA-ACNC: 0.04 IU/ML
M TB IFN-G CD4+ BCKGRND COR BLD-ACNC: -0.01 IU/ML
M TB IFN-G CD4+ BCKGRND COR BLD-ACNC: -0.01 IU/ML
MITOGEN IGNF BCKGRD COR BLD-ACNC: 9.96 IU/ML
TB GOLD PLUS: NEGATIVE

## 2023-07-20 ENCOUNTER — PATIENT MESSAGE (OUTPATIENT)
Dept: PRIMARY CARE CLINIC | Facility: CLINIC | Age: 18
End: 2023-07-20
Payer: COMMERCIAL

## 2023-08-09 DIAGNOSIS — N91.3 PRIMARY OLIGOMENORRHEA: ICD-10-CM

## 2023-08-09 RX ORDER — LEVONORGESTREL AND ETHINYL ESTRADIOL 0.1-0.02MG
1 KIT ORAL DAILY
Qty: 28 TABLET | Refills: 11 | Status: SHIPPED | OUTPATIENT
Start: 2023-08-09 | End: 2023-09-28

## 2023-09-13 ENCOUNTER — OFFICE VISIT (OUTPATIENT)
Dept: PSYCHIATRY | Facility: CLINIC | Age: 18
End: 2023-09-13
Payer: COMMERCIAL

## 2023-09-13 VITALS
DIASTOLIC BLOOD PRESSURE: 61 MMHG | HEART RATE: 82 BPM | SYSTOLIC BLOOD PRESSURE: 109 MMHG | BODY MASS INDEX: 21.62 KG/M2 | WEIGHT: 107.06 LBS

## 2023-09-13 DIAGNOSIS — F90.9 ATTENTION DEFICIT HYPERACTIVITY DISORDER (ADHD), UNSPECIFIED ADHD TYPE: Primary | ICD-10-CM

## 2023-09-13 PROCEDURE — 3078F PR MOST RECENT DIASTOLIC BLOOD PRESSURE < 80 MM HG: ICD-10-PCS | Mod: CPTII,S$GLB,, | Performed by: STUDENT IN AN ORGANIZED HEALTH CARE EDUCATION/TRAINING PROGRAM

## 2023-09-13 PROCEDURE — 3074F SYST BP LT 130 MM HG: CPT | Mod: CPTII,S$GLB,, | Performed by: STUDENT IN AN ORGANIZED HEALTH CARE EDUCATION/TRAINING PROGRAM

## 2023-09-13 PROCEDURE — 99999 PR PBB SHADOW E&M-EST. PATIENT-LVL II: ICD-10-PCS | Mod: PBBFAC,,, | Performed by: STUDENT IN AN ORGANIZED HEALTH CARE EDUCATION/TRAINING PROGRAM

## 2023-09-13 PROCEDURE — 99215 OFFICE O/P EST HI 40 MIN: CPT | Mod: S$GLB,,, | Performed by: STUDENT IN AN ORGANIZED HEALTH CARE EDUCATION/TRAINING PROGRAM

## 2023-09-13 PROCEDURE — 99999 PR PBB SHADOW E&M-EST. PATIENT-LVL II: CPT | Mod: PBBFAC,,, | Performed by: STUDENT IN AN ORGANIZED HEALTH CARE EDUCATION/TRAINING PROGRAM

## 2023-09-13 PROCEDURE — 3008F PR BODY MASS INDEX (BMI) DOCUMENTED: ICD-10-PCS | Mod: CPTII,S$GLB,, | Performed by: STUDENT IN AN ORGANIZED HEALTH CARE EDUCATION/TRAINING PROGRAM

## 2023-09-13 PROCEDURE — 3008F BODY MASS INDEX DOCD: CPT | Mod: CPTII,S$GLB,, | Performed by: STUDENT IN AN ORGANIZED HEALTH CARE EDUCATION/TRAINING PROGRAM

## 2023-09-13 PROCEDURE — 99215 PR OFFICE/OUTPT VISIT, EST, LEVL V, 40-54 MIN: ICD-10-PCS | Mod: S$GLB,,, | Performed by: STUDENT IN AN ORGANIZED HEALTH CARE EDUCATION/TRAINING PROGRAM

## 2023-09-13 PROCEDURE — 3078F DIAST BP <80 MM HG: CPT | Mod: CPTII,S$GLB,, | Performed by: STUDENT IN AN ORGANIZED HEALTH CARE EDUCATION/TRAINING PROGRAM

## 2023-09-13 PROCEDURE — 3074F PR MOST RECENT SYSTOLIC BLOOD PRESSURE < 130 MM HG: ICD-10-PCS | Mod: CPTII,S$GLB,, | Performed by: STUDENT IN AN ORGANIZED HEALTH CARE EDUCATION/TRAINING PROGRAM

## 2023-09-13 RX ORDER — DEXTROAMPHETAMINE SACCHARATE, AMPHETAMINE ASPARTATE MONOHYDRATE, DEXTROAMPHETAMINE SULFATE AND AMPHETAMINE SULFATE 5; 5; 5; 5 MG/1; MG/1; MG/1; MG/1
20 CAPSULE, EXTENDED RELEASE ORAL DAILY
Qty: 30 CAPSULE | Refills: 0 | Status: SHIPPED | OUTPATIENT
Start: 2023-10-13 | End: 2023-10-31 | Stop reason: SDUPTHER

## 2023-09-13 RX ORDER — DEXTROAMPHETAMINE SACCHARATE, AMPHETAMINE ASPARTATE MONOHYDRATE, DEXTROAMPHETAMINE SULFATE AND AMPHETAMINE SULFATE 5; 5; 5; 5 MG/1; MG/1; MG/1; MG/1
20 CAPSULE, EXTENDED RELEASE ORAL DAILY
Qty: 30 CAPSULE | Refills: 0 | Status: SHIPPED | OUTPATIENT
Start: 2023-09-13 | End: 2023-10-25

## 2023-09-23 NOTE — PROGRESS NOTES
Outpatient Psychiatry Follow-Up Visit (MD/NP)    9/13/2023    Clinical Status of Patient:  Outpatient (Ambulatory)    Chief Complaint:  Laura Erazo is a 18 y.o. female who presents today for follow-up of  ADHD . Met with patient.      Interval History and Content of Current Session:  Interim Events/Subjective Report/Content of Current Session:   Patient arrived 15 minutes late to appt--abbreviated visit performed. She states her symptoms have been well controlled since increasing Adderall dose to 20 mg in April. 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 She endorses good sleep and appetite. Denies any suicidal ideation, intent, or plan. Denies any other questions or complaints at this time.     Review of Systems   Psychiatric Review of Systems-is patient experiencing or having changes in  sleep: no  appetite: no  weight: no  energy/anergy: no  anhedonia: no  libido: did not assess  anxiety: no  guilty/hopelessness: no  concentration: yes, see chart review  Irritability: no  Paranoia/delusions: no  S.I.B.s/risky behavior: no    Medical ROS: See HPI    Past Medical, Family and Social History: The patient's past medical, family and social history have been reviewed and updated as appropriate within the electronic medical record - see encounter notes.    Current Meds: Adderall 20 mg XR  Compliance: N/A    Previous Medications/Side effects   - Straterra: caused constipation         Risk Parameters:  Patient reports no suicidal ideation  Patient reports no homicidal ideation  Patient reports no self-injurious behavior  Patient reports no violent behavior    Exam (detailed: at least 9 elements; comprehensive: all 15 elements)   Constitutional  Vitals:  Most recent vital signs, dated less than 90 days prior to this appointment, were reviewed.   Vitals:    09/13/23 0910   BP: 109/61   Pulse: 82   Weight:  48.5 kg (107 lb 0.5 oz)        General:  unremarkable, age appropriate     Musculoskeletal  Muscle Strength/Tone:  Within normal limits   Gait & Station:  non-ataxic     Psychiatric  Speech:  no latency; no press   Mood & Affect:  euthymic  congruent and appropriate   Thought Process:  normal and logical   Associations:  intact   Thought Content:  no suicidality, no homicidality, delusions, or paranoia   Insight:  intact, has awareness of illness   Judgement: behavior is adequate to circumstances   Orientation:  grossly intact   Memory: intact for content of interview   Language: grossly intact   Attention Span & Concentration:  able to focus   Fund of Knowledge:  intact and appropriate to age and level of education     Assessment and Diagnosis   18 y.o. female with ADHD presenting for medication management and follow up s/p Psychological testing for focus and concentration issues.   -Nov 2022: Initial Visit--discussed ADHD testing.  -January: 2023: Started Straterra trial  -March 2023: Minimal improvement with Strattera plus side effects-discontinued; initiated Adderall XR.   -April 2023: Improvement with Adderall XR 10mg, will increase dose   -May 2023: Patient feels comfortable with current Adderall XR 20 mg dose without issues  -September 2023: Continues to feel comfortable with Adderall XR 20 mg, no issues reported    General Impression:    ICD-10-CM ICD-9-CM   1. Attention deficit hyperactivity disorder (ADHD), unspecified ADHD type  F90.9 314.01             Intervention/Counseling/Treatment Plan   Psych Meds:  Continue Adderall XR 20 mg PO Daily   Discussed below risks of stimulant with patient:    Increased risk for heart problems, high blood pressure, and sudden cardiac death.   Decreased appetite. People seem to be less hungry during the middle of the day, but they are often hungry by dinnertime as the medication wears off.   Sleep problems. If a person cannot fall asleep, the doctor may prescribe a lower  dose. The doctor might also suggest that the medication is taken earlier in the day, or stop the afternoon or evening dose.    Stomach aches and headaches.   Tics. A few people develop sudden, repetitive movements or sounds called tics. These tics may or may not be noticeable. Changing the medication dosage may make tics go away. Some people also may appear to have a personality change, such as appearing flat or without emotion. Talk with your doctor if you see any of these side effects.  Stimulant medications may lead to drug abuse or dependence, but the risk is highest for those patient taking this class of medication who do not have ADHD. Research shows that teens with ADHD who took stimulant medications were less likely to abuse drugs than those who did not take stimulant medications. Proper diagnosis is the key to minimizing this risk.  Discussed with female patients that they will need to be off of stimulant if trying to become pregnant or become pregnant.    Will monitor mood/adjustment disorder as discussed in Psychology evaluation     Labs/Vitals:  WNL      LAPMP Reviewed, no concerns    Other  Instructions:  Take all medications as prescribed.    Abstain from recreational drugs and alcohol.  Present to ED or call 911 for SI/HI plan or intent, psychosis, or medical emergency.    -Discussed avoiding pregnancy while taking psychiatric medication. Aware she should make an appointment and discuss discontinuing medication if concerns for pregnancy or decision is made to attempt to conceive.        Return to Clinic: 1 month      Ian Coleman MD  [facility], PGY-3

## 2023-09-28 ENCOUNTER — OFFICE VISIT (OUTPATIENT)
Dept: OBSTETRICS AND GYNECOLOGY | Facility: CLINIC | Age: 18
End: 2023-09-28
Payer: COMMERCIAL

## 2023-09-28 VITALS
SYSTOLIC BLOOD PRESSURE: 111 MMHG | DIASTOLIC BLOOD PRESSURE: 68 MMHG | WEIGHT: 104.06 LBS | BODY MASS INDEX: 21.02 KG/M2

## 2023-09-28 DIAGNOSIS — Z01.419 WELL WOMAN EXAM WITH ROUTINE GYNECOLOGICAL EXAM: Primary | ICD-10-CM

## 2023-09-28 DIAGNOSIS — N91.3 PRIMARY OLIGOMENORRHEA: ICD-10-CM

## 2023-09-28 PROCEDURE — 3008F PR BODY MASS INDEX (BMI) DOCUMENTED: ICD-10-PCS | Mod: CPTII,S$GLB,, | Performed by: OBSTETRICS & GYNECOLOGY

## 2023-09-28 PROCEDURE — 99395 PR PREVENTIVE VISIT,EST,18-39: ICD-10-PCS | Mod: S$GLB,,, | Performed by: OBSTETRICS & GYNECOLOGY

## 2023-09-28 PROCEDURE — 99999 PR PBB SHADOW E&M-EST. PATIENT-LVL III: ICD-10-PCS | Mod: PBBFAC,,, | Performed by: OBSTETRICS & GYNECOLOGY

## 2023-09-28 PROCEDURE — 3074F SYST BP LT 130 MM HG: CPT | Mod: CPTII,S$GLB,, | Performed by: OBSTETRICS & GYNECOLOGY

## 2023-09-28 PROCEDURE — 1159F MED LIST DOCD IN RCRD: CPT | Mod: CPTII,S$GLB,, | Performed by: OBSTETRICS & GYNECOLOGY

## 2023-09-28 PROCEDURE — 3074F PR MOST RECENT SYSTOLIC BLOOD PRESSURE < 130 MM HG: ICD-10-PCS | Mod: CPTII,S$GLB,, | Performed by: OBSTETRICS & GYNECOLOGY

## 2023-09-28 PROCEDURE — 3078F DIAST BP <80 MM HG: CPT | Mod: CPTII,S$GLB,, | Performed by: OBSTETRICS & GYNECOLOGY

## 2023-09-28 PROCEDURE — 1159F PR MEDICATION LIST DOCUMENTED IN MEDICAL RECORD: ICD-10-PCS | Mod: CPTII,S$GLB,, | Performed by: OBSTETRICS & GYNECOLOGY

## 2023-09-28 PROCEDURE — 3008F BODY MASS INDEX DOCD: CPT | Mod: CPTII,S$GLB,, | Performed by: OBSTETRICS & GYNECOLOGY

## 2023-09-28 PROCEDURE — 99999 PR PBB SHADOW E&M-EST. PATIENT-LVL III: CPT | Mod: PBBFAC,,, | Performed by: OBSTETRICS & GYNECOLOGY

## 2023-09-28 PROCEDURE — 1160F PR REVIEW ALL MEDS BY PRESCRIBER/CLIN PHARMACIST DOCUMENTED: ICD-10-PCS | Mod: CPTII,S$GLB,, | Performed by: OBSTETRICS & GYNECOLOGY

## 2023-09-28 PROCEDURE — 3078F PR MOST RECENT DIASTOLIC BLOOD PRESSURE < 80 MM HG: ICD-10-PCS | Mod: CPTII,S$GLB,, | Performed by: OBSTETRICS & GYNECOLOGY

## 2023-09-28 PROCEDURE — 1160F RVW MEDS BY RX/DR IN RCRD: CPT | Mod: CPTII,S$GLB,, | Performed by: OBSTETRICS & GYNECOLOGY

## 2023-09-28 PROCEDURE — 99395 PREV VISIT EST AGE 18-39: CPT | Mod: S$GLB,,, | Performed by: OBSTETRICS & GYNECOLOGY

## 2023-09-28 RX ORDER — DROSPIRENONE AND ETHINYL ESTRADIOL 0.02-3(28)
1 KIT ORAL DAILY
Qty: 84 TABLET | Refills: 4 | Status: SHIPPED | OUTPATIENT
Start: 2023-09-28 | End: 2024-01-22

## 2023-09-28 NOTE — PROGRESS NOTES
Subjective:      Patient ID: Laura Erazo is a 18 y.o. female.    Chief Complaint:  Well Woman      History of Present Illness  HPI  Annual Exam-Premenopausal  Patient presents for annual exam. The patient has no complaints today. The patient is not currently sexually active. GYN screening history: no prior history of gyn screening tests. The patient wears seatbelts: yes. The patient participates in regular exercise: yes. Has the patient ever been transfused or tattooed?: no. The patient reports that there is not domestic violence in her life.  Stopped prior ocp's.  Noticed that she wasn't feeling as good on them as she has been off them.  Irregular cycles are not a major concern.  But does need contraception and interested in a different pill.    GYN & OB History  No LMP recorded.   Date of Last Pap: No result found    OB History   No obstetric history on file.       Review of Systems  Review of Systems   Constitutional:  Negative for activity change, appetite change and fatigue.   HENT: Negative.  Negative for tinnitus.    Eyes: Negative.    Respiratory:  Negative for cough and shortness of breath.    Cardiovascular:  Negative for chest pain and palpitations.   Gastrointestinal: Negative.  Negative for abdominal pain, blood in stool, constipation, diarrhea and nausea.   Endocrine: Negative.  Negative for hot flashes.   Genitourinary:  Negative for dysmenorrhea, dyspareunia, dysuria, frequency, menstrual problem, pelvic pain, vaginal discharge, urinary incontinence and postcoital bleeding.   Musculoskeletal:  Negative for back pain and joint swelling.   Integumentary:  Negative for rash, breast mass, nipple discharge and breast skin changes.   Neurological: Negative.  Negative for headaches.   Hematological: Negative.  Does not bruise/bleed easily.   Psychiatric/Behavioral:  The patient is not nervous/anxious.    Breast: negative.  Negative for mass, nipple discharge and skin changes         Objective:      Physical Exam:   Constitutional: She is oriented to person, place, and time. She appears well-developed and well-nourished. No distress.    HENT:   Head: Normocephalic and atraumatic.    Eyes: Pupils are equal, round, and reactive to light. Conjunctivae and lids are normal.     Cardiovascular:  Normal rate and regular rhythm.      Exam reveals no edema.        Pulmonary/Chest: Effort normal.        Abdominal: Soft. Bowel sounds are normal. She exhibits no distension. There is no abdominal tenderness. There is no rebound and no guarding.     Genitourinary:    Vagina, uterus, right adnexa and left adnexa normal.      Pelvic exam was performed with patient supine.   The external female genitalia was normal.   Labial bartholins normal.There is no tenderness or lesion on the right labia. There is no tenderness or lesion on the left labia. Cervix is normal. Right adnexum displays no mass and no tenderness. Left adnexum displays no mass and no tenderness. No  no vaginal discharge, rectocele, cystocele or unspecified prolapse of vaginal walls in the vagina. Cervix exhibits no motion tenderness and no discharge. Uterus is not deviated, not fixed and not hosting fibroids. Normal urethral meatus.Urethra findings: no tendernessBladder findings: no bladder tenderness          Musculoskeletal: Normal range of motion and moves all extremeties.       Neurological: She is alert and oriented to person, place, and time. She has normal strength.    Skin: Skin is warm and dry.    Psychiatric: She has a normal mood and affect. Her speech is normal and behavior is normal. Thought content normal. Her mood appears not anxious. She does not exhibit a depressed mood. She expresses no suicidal plans and no homicidal plans.         Assessment:     1. Well woman exam with routine gynecological exam    2. Primary oligomenorrhea               Plan:     Laura was seen today for well woman.    Diagnoses and all orders for this visit:    Well  woman exam with routine gynecological exam  Pap at 19  Declined chl screening.  Primary oligomenorrhea  -     drospirenone-ethinyl estradioL (DARIAN) 3-0.02 mg per tablet; Take 1 tablet by mouth once daily.  The use of the oral contraceptive has been fully discussed with the patient. This includes the proper method to initiate and continue the pills, the need for regular compliance to ensure adequate contraceptive effect, the physiology which make the pill effective, the instructions for what to do in event of a missed pill, and warnings about anticipated minor side effects such as breakthrough spotting, nausea, breast tenderness, weight changes, acne, headaches, etc.  She has been told of the more serious potential side effects such as MI, stroke, and deep vein thrombosis, all of which are very unlikely.  She has been asked to report any signs of such serious problems immediately.  She should back up the pill with a condom during any cycle in which antibiotics are prescribed, and during the first cycle as well. The need for additional protection, such as a condom, to prevent exposure to sexually transmitted diseases has also been discussed- the patient has been clearly reminded that OCP's cannot protect her against diseases such as HIV and others. She understands and wishes to take the medication as prescribed.

## 2023-10-31 ENCOUNTER — OFFICE VISIT (OUTPATIENT)
Dept: PSYCHIATRY | Facility: CLINIC | Age: 18
End: 2023-10-31
Payer: COMMERCIAL

## 2023-10-31 VITALS
SYSTOLIC BLOOD PRESSURE: 132 MMHG | HEART RATE: 88 BPM | BODY MASS INDEX: 20.26 KG/M2 | WEIGHT: 100.31 LBS | DIASTOLIC BLOOD PRESSURE: 72 MMHG

## 2023-10-31 DIAGNOSIS — F90.9 ATTENTION DEFICIT HYPERACTIVITY DISORDER (ADHD), UNSPECIFIED ADHD TYPE: Primary | ICD-10-CM

## 2023-10-31 PROCEDURE — 99999 PR PBB SHADOW E&M-EST. PATIENT-LVL II: CPT | Mod: PBBFAC,,, | Performed by: STUDENT IN AN ORGANIZED HEALTH CARE EDUCATION/TRAINING PROGRAM

## 2023-10-31 PROCEDURE — 3075F SYST BP GE 130 - 139MM HG: CPT | Mod: CPTII,S$GLB,, | Performed by: STUDENT IN AN ORGANIZED HEALTH CARE EDUCATION/TRAINING PROGRAM

## 2023-10-31 PROCEDURE — 3008F BODY MASS INDEX DOCD: CPT | Mod: CPTII,S$GLB,, | Performed by: STUDENT IN AN ORGANIZED HEALTH CARE EDUCATION/TRAINING PROGRAM

## 2023-10-31 PROCEDURE — 3008F PR BODY MASS INDEX (BMI) DOCUMENTED: ICD-10-PCS | Mod: CPTII,S$GLB,, | Performed by: STUDENT IN AN ORGANIZED HEALTH CARE EDUCATION/TRAINING PROGRAM

## 2023-10-31 PROCEDURE — 1159F PR MEDICATION LIST DOCUMENTED IN MEDICAL RECORD: ICD-10-PCS | Mod: CPTII,S$GLB,, | Performed by: STUDENT IN AN ORGANIZED HEALTH CARE EDUCATION/TRAINING PROGRAM

## 2023-10-31 PROCEDURE — 99213 PR OFFICE/OUTPT VISIT, EST, LEVL III, 20-29 MIN: ICD-10-PCS | Mod: S$GLB,,, | Performed by: STUDENT IN AN ORGANIZED HEALTH CARE EDUCATION/TRAINING PROGRAM

## 2023-10-31 PROCEDURE — 1160F PR REVIEW ALL MEDS BY PRESCRIBER/CLIN PHARMACIST DOCUMENTED: ICD-10-PCS | Mod: CPTII,S$GLB,, | Performed by: STUDENT IN AN ORGANIZED HEALTH CARE EDUCATION/TRAINING PROGRAM

## 2023-10-31 PROCEDURE — 1159F MED LIST DOCD IN RCRD: CPT | Mod: CPTII,S$GLB,, | Performed by: STUDENT IN AN ORGANIZED HEALTH CARE EDUCATION/TRAINING PROGRAM

## 2023-10-31 PROCEDURE — 1160F RVW MEDS BY RX/DR IN RCRD: CPT | Mod: CPTII,S$GLB,, | Performed by: STUDENT IN AN ORGANIZED HEALTH CARE EDUCATION/TRAINING PROGRAM

## 2023-10-31 PROCEDURE — 3078F PR MOST RECENT DIASTOLIC BLOOD PRESSURE < 80 MM HG: ICD-10-PCS | Mod: CPTII,S$GLB,, | Performed by: STUDENT IN AN ORGANIZED HEALTH CARE EDUCATION/TRAINING PROGRAM

## 2023-10-31 PROCEDURE — 99213 OFFICE O/P EST LOW 20 MIN: CPT | Mod: S$GLB,,, | Performed by: STUDENT IN AN ORGANIZED HEALTH CARE EDUCATION/TRAINING PROGRAM

## 2023-10-31 PROCEDURE — 3075F PR MOST RECENT SYSTOLIC BLOOD PRESS GE 130-139MM HG: ICD-10-PCS | Mod: CPTII,S$GLB,, | Performed by: STUDENT IN AN ORGANIZED HEALTH CARE EDUCATION/TRAINING PROGRAM

## 2023-10-31 PROCEDURE — 3078F DIAST BP <80 MM HG: CPT | Mod: CPTII,S$GLB,, | Performed by: STUDENT IN AN ORGANIZED HEALTH CARE EDUCATION/TRAINING PROGRAM

## 2023-10-31 PROCEDURE — 99999 PR PBB SHADOW E&M-EST. PATIENT-LVL II: ICD-10-PCS | Mod: PBBFAC,,, | Performed by: STUDENT IN AN ORGANIZED HEALTH CARE EDUCATION/TRAINING PROGRAM

## 2023-10-31 RX ORDER — DEXTROAMPHETAMINE SACCHARATE, AMPHETAMINE ASPARTATE MONOHYDRATE, DEXTROAMPHETAMINE SULFATE AND AMPHETAMINE SULFATE 5; 5; 5; 5 MG/1; MG/1; MG/1; MG/1
20 CAPSULE, EXTENDED RELEASE ORAL DAILY
Qty: 30 CAPSULE | Refills: 0 | Status: SHIPPED | OUTPATIENT
Start: 2023-10-31 | End: 2023-12-13

## 2023-10-31 RX ORDER — DEXTROAMPHETAMINE SACCHARATE, AMPHETAMINE ASPARTATE MONOHYDRATE, DEXTROAMPHETAMINE SULFATE AND AMPHETAMINE SULFATE 5; 5; 5; 5 MG/1; MG/1; MG/1; MG/1
20 CAPSULE, EXTENDED RELEASE ORAL DAILY
Qty: 30 CAPSULE | Refills: 0 | Status: SHIPPED | OUTPATIENT
Start: 2023-12-31 | End: 2024-02-20 | Stop reason: SDUPTHER

## 2023-10-31 RX ORDER — DEXTROAMPHETAMINE SACCHARATE, AMPHETAMINE ASPARTATE MONOHYDRATE, DEXTROAMPHETAMINE SULFATE AND AMPHETAMINE SULFATE 5; 5; 5; 5 MG/1; MG/1; MG/1; MG/1
20 CAPSULE, EXTENDED RELEASE ORAL DAILY
Qty: 30 CAPSULE | Refills: 0 | Status: SHIPPED | OUTPATIENT
Start: 2023-11-30 | End: 2024-01-19

## 2023-10-31 NOTE — PROGRESS NOTES
Outpatient Psychiatry Follow-Up Visit (MD/NP)  10/31/2023  Clinical Status of Patient:  Outpatient (Ambulatory)      Chief Complaint:  Laura Erazo is a 18 y.o. female who presents today for follow-up of  ADHD . Met with patient.        Interval History and Content of Current Session:  Patient states her symptoms continue to be well controlled with Adderall at 20 mg dose. 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 She denies any side effects from the medication. Endorses good sleep and appetite. Patient denies any symptoms of depression or anxiety. Denies any questions or complaints at this time.     Review of Systems   Psychiatric Review of Systems-is patient experiencing or having changes in  sleep: no  appetite: no  weight: no  energy/anergy: no  anhedonia: no  libido: did not assess  anxiety: no  guilty/hopelessness: no  concentration: yes, see chart review  Irritability: no  Paranoia/delusions: no  S.I.B.s/risky behavior: no    Medical ROS: See HPI    Past Medical, Family and Social History: The patient's past medical, family and social history have been reviewed and updated as appropriate within the electronic medical record - see encounter notes.    Current Meds: Adderall 20 mg XR  Compliance: yes    Previous Medications/Side effects   - Straterra: caused constipation       Risk Parameters:  Patient reports no suicidal ideation  Patient reports no homicidal ideation  Patient reports no self-injurious behavior  Patient reports no violent behavior    Exam (detailed: at least 9 elements; comprehensive: all 15 elements)   Constitutional  Vitals:  Most recent vital signs, dated less than 90 days prior to this appointment, were reviewed.   Vitals:    10/31/23 1128   BP: 132/72   Pulse: 88   Weight: 45.5 kg (100 lb 5 oz)        General:  unremarkable, age appropriate     Musculoskeletal  Muscle Strength/Tone:   Within normal limits   Gait & Station:  non-ataxic     Psychiatric  Speech:  no latency; no press   Mood & Affect:  euthymic  congruent and appropriate   Thought Process:  normal and logical   Associations:  intact   Thought Content:  no suicidality, no homicidality, delusions, or paranoia   Insight:  intact, has awareness of illness   Judgement: behavior is adequate to circumstances   Orientation:  grossly intact   Memory: intact for content of interview   Language: grossly intact   Attention Span & Concentration:  able to focus   Fund of Knowledge:  intact and appropriate to age and level of education     Assessment and Diagnosis   18 y.o. female with ADHD presenting for medication management and follow up s/p Psychological testing for focus and concentration issues.   -Nov 2022: Initial Visit--discussed ADHD testing.  -January: 2023: Started Straterra trial  -March 2023: Minimal improvement with Strattera plus side effects-discontinued; initiated Adderall XR.   -April 2023: Improvement with Adderall XR 10mg, will increase dose   -May 2023: Patient feels comfortable with current Adderall XR 20 mg dose without issues  -September 2023: Continues to feel comfortable with Adderall XR 20 mg, no issues reported  -October 2023: Continues to feel comfortable with Adderall XR 20 mg, no issues reported    General Impression:    ICD-10-CM ICD-9-CM   1. Attention deficit hyperactivity disorder (ADHD), unspecified ADHD type  F90.9 314.01             Intervention/Counseling/Treatment Plan   Psych Meds:  Continue Adderall XR 20 mg PO Daily   Discussed below risks of stimulant with patient:    Increased risk for heart problems, high blood pressure, and sudden cardiac death.   Decreased appetite. People seem to be less hungry during the middle of the day, but they are often hungry by dinnertime as the medication wears off.   Sleep problems. If a person cannot fall asleep, the doctor may prescribe a lower dose. The doctor might  also suggest that the medication is taken earlier in the day, or stop the afternoon or evening dose.    Stomach aches and headaches.   Tics. A few people develop sudden, repetitive movements or sounds called tics. These tics may or may not be noticeable. Changing the medication dosage may make tics go away. Some people also may appear to have a personality change, such as appearing flat or without emotion. Talk with your doctor if you see any of these side effects.  Stimulant medications may lead to drug abuse or dependence, but the risk is highest for those patient taking this class of medication who do not have ADHD. Research shows that teens with ADHD who took stimulant medications were less likely to abuse drugs than those who did not take stimulant medications. Proper diagnosis is the key to minimizing this risk.  Discussed with female patients that they will need to be off of stimulant if trying to become pregnant or become pregnant.    Will monitor mood/adjustment disorder as discussed in Psychology evaluation     Labs/Vitals:  WNL    LAPMP Reviewed, no concerns    Other  Instructions:  Take all medications as prescribed.    Abstain from recreational drugs and alcohol.  Present to ED or call 911 for SI/HI plan or intent, psychosis, or medical emergency.    -Discussed avoiding pregnancy while taking psychiatric medication. Aware she should make an appointment and discuss discontinuing medication if concerns for pregnancy or decision is made to attempt to conceive.        Return to Clinic: 3 months      Ian Coleman MD  [facility], PGY-3

## 2023-12-04 DIAGNOSIS — F90.9 ATTENTION DEFICIT HYPERACTIVITY DISORDER (ADHD), UNSPECIFIED ADHD TYPE: ICD-10-CM

## 2023-12-05 RX ORDER — DEXTROAMPHETAMINE SACCHARATE, AMPHETAMINE ASPARTATE MONOHYDRATE, DEXTROAMPHETAMINE SULFATE AND AMPHETAMINE SULFATE 5; 5; 5; 5 MG/1; MG/1; MG/1; MG/1
20 CAPSULE, EXTENDED RELEASE ORAL DAILY
Qty: 30 CAPSULE | Refills: 0 | OUTPATIENT
Start: 2023-12-05 | End: 2024-01-04

## 2024-01-22 ENCOUNTER — OFFICE VISIT (OUTPATIENT)
Dept: PRIMARY CARE CLINIC | Facility: CLINIC | Age: 19
End: 2024-01-22
Payer: COMMERCIAL

## 2024-01-22 VITALS
SYSTOLIC BLOOD PRESSURE: 110 MMHG | HEART RATE: 71 BPM | RESPIRATION RATE: 18 BRPM | OXYGEN SATURATION: 99 % | HEIGHT: 59 IN | WEIGHT: 96.13 LBS | DIASTOLIC BLOOD PRESSURE: 72 MMHG | BODY MASS INDEX: 19.38 KG/M2 | TEMPERATURE: 98 F

## 2024-01-22 DIAGNOSIS — K64.9 HEMORRHOIDS, UNSPECIFIED HEMORRHOID TYPE: Primary | ICD-10-CM

## 2024-01-22 PROCEDURE — 99499 UNLISTED E&M SERVICE: CPT | Mod: S$GLB,,, | Performed by: INTERNAL MEDICINE

## 2024-01-22 NOTE — PATIENT INSTRUCTIONS
Thank you for visiting today.  As you suspected you have a hemorrhoid.  It was not actively bleeding at this time.  The following is my recommendation for care of your hemorrhoid  Try to soak at least twice daily and a warm tub  After a bowel movement and warm soaks apply your preparation H. if there is irritation in the area mixed with a little over-the-counter cortisone cream in his small amount of Vaseline and rub that combination onto the hemorrhoid area.  Fiber such as Benefiber Metamucil daily   Daily bowel movement and regular bowel time going to the after a meal  Do not strain at stool.    If this bleeding persists you should see a colorectal surgeon who can further evaluate treat you

## 2024-01-22 NOTE — PROGRESS NOTES
18-year-old woman with a history of ADHD, history of hyperreactive airway disease, with a chief complaint of rectal bleeding.    History of present illness and pertinent review of systems:  The patient noted blood on her underwear last week on 1 occasion and then again on Sunday morning.  The patient notes no constipation.  The patient believes it may be a hemorrhoid.  She notes a prior history of hemorrhoid.  She notes that passing stool is not painful and there is no constant burning sensation of the rectum.  She states it has not coming from her vagina.  The patient has been using an over-the-counter preparation H hemorrhoid cream for the past week.  The patient had soaked on 1 occasion and a warm tub.  The patient has not been bleeding elsewhere except for an occasional episode of epistaxis which he has had most of her life.  The patient is weight and appetite are stable.  There is no unexplained weight    Problem list, medication list, and allergies have been reviewed.  The patient has no known drug allergies.    Review of systems is otherwise negative.    Physical Exam  Constitutional:       General: She is not in acute distress.     Appearance: Normal appearance. She is normal weight. She is not ill-appearing, toxic-appearing or diaphoretic.   HENT:      Head: Atraumatic.      Nose: Nose normal.      Mouth/Throat:      Mouth: Mucous membranes are moist.      Pharynx: Oropharynx is clear. No posterior oropharyngeal erythema.   Eyes:      General: No scleral icterus.     Conjunctiva/sclera: Conjunctivae normal.   Cardiovascular:      Rate and Rhythm: Normal rate and regular rhythm.      Pulses: Normal pulses.      Heart sounds: Normal heart sounds. No murmur heard.     No gallop.   Pulmonary:      Effort: Pulmonary effort is normal. No respiratory distress.      Breath sounds: Normal breath sounds. No wheezing, rhonchi or rales.   Abdominal:      General: Bowel sounds are normal. There is no distension.       Palpations: Abdomen is soft. There is no mass.      Tenderness: There is no abdominal tenderness. There is no guarding.      Comments: No hepatosplenomegaly   Genitourinary:     Comments: The patient has a small hemorrhoid which is not seen as bleeding extending from her rectum.  Musculoskeletal:      Cervical back: No tenderness.   Lymphadenopathy:      Cervical: No cervical adenopathy.   Skin:     Coloration: Skin is not jaundiced.      Findings: No bruising, erythema or rash.   Neurological:      General: No focal deficit present.      Mental Status: She is alert and oriented to person, place, and time.   Psychiatric:         Mood and Affect: Mood normal.         Behavior: Behavior normal.     Assessment/plan:   Hemorrhoid:  The patient has a small hemorrhoid which is not actively bleeding.  It is possible she has additional internal hemorrhoids which may be bleeding.  There was no evidence of recent blood.  Plan:  Reviewed above with the patient   Explanation of the cause of hemorrhoids   Continue use preparation H and if burning or irritation develops she can mixed the cream with over-the-counter cortisone cream and a small amount of Vaseline.    The patient should do the above at least twice daily   The patient was told to spray the rectal area with a hand-held shower daily to remove any small amounts of stool that may be irritating it.  No straining  The patient should have a regular bowel time and try to move her bowels daily after a meal   The patient should increase the bulk in her diet having a fiber supplement daily

## 2024-02-20 ENCOUNTER — OFFICE VISIT (OUTPATIENT)
Dept: PSYCHIATRY | Facility: CLINIC | Age: 19
End: 2024-02-20
Payer: COMMERCIAL

## 2024-02-20 DIAGNOSIS — F90.9 ATTENTION DEFICIT HYPERACTIVITY DISORDER (ADHD), UNSPECIFIED ADHD TYPE: ICD-10-CM

## 2024-02-20 PROCEDURE — 99213 OFFICE O/P EST LOW 20 MIN: CPT | Mod: S$GLB,,, | Performed by: STUDENT IN AN ORGANIZED HEALTH CARE EDUCATION/TRAINING PROGRAM

## 2024-02-20 PROCEDURE — 99999 PR PBB SHADOW E&M-EST. PATIENT-LVL I: CPT | Mod: PBBFAC,,, | Performed by: STUDENT IN AN ORGANIZED HEALTH CARE EDUCATION/TRAINING PROGRAM

## 2024-02-20 RX ORDER — DEXTROAMPHETAMINE SACCHARATE, AMPHETAMINE ASPARTATE MONOHYDRATE, DEXTROAMPHETAMINE SULFATE AND AMPHETAMINE SULFATE 5; 5; 5; 5 MG/1; MG/1; MG/1; MG/1
20 CAPSULE, EXTENDED RELEASE ORAL DAILY
Qty: 30 CAPSULE | Refills: 0 | Status: SHIPPED | OUTPATIENT
Start: 2024-03-29 | End: 2024-04-16 | Stop reason: SDUPTHER

## 2024-02-20 RX ORDER — DEXTROAMPHETAMINE SACCHARATE, AMPHETAMINE ASPARTATE MONOHYDRATE, DEXTROAMPHETAMINE SULFATE AND AMPHETAMINE SULFATE 5; 5; 5; 5 MG/1; MG/1; MG/1; MG/1
20 CAPSULE, EXTENDED RELEASE ORAL DAILY
Qty: 30 CAPSULE | Refills: 0 | Status: SHIPPED | OUTPATIENT
Start: 2024-02-29 | End: 2024-04-04

## 2024-03-07 NOTE — PROGRESS NOTES
Outpatient Psychiatry Follow-Up Visit (MD/NP)  2/20/2024  Clinical Status of Patient:  Outpatient (Ambulatory)      Chief Complaint:  Laura Erazo is a 18 y.o. female who presents today for follow-up of  ADHD . Met with patient.        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 She attributes this to treatment with Adderall XR and establishing other techniques to limit disorganization and forgetfulness. She denies any side effects from the medication. She endorses good sleep and appetite. Patient denies any symptoms of anxiety or depression at this time.     Review of Systems   Psychiatric Review of Systems-is patient experiencing or having changes in  sleep: no  appetite: no  weight: no  energy/anergy: no  anhedonia: no  libido: did not assess  anxiety: no  guilty/hopelessness: no  concentration: yes, see chart review  Irritability: no  Paranoia/delusions: no  S.I.B.s/risky behavior: no    Medical ROS: See HPI    Past Medical, Family and Social History: The patient's past medical, family and social history have been reviewed and updated as appropriate within the electronic medical record - see encounter notes.    Current Meds: Adderall 20 mg XR  Compliance: yes    Previous Medications/Side effects   - Straterra: caused constipation       Risk Parameters:  Patient reports no suicidal ideation  Patient reports no homicidal ideation  Patient reports no self-injurious behavior  Patient reports no violent behavior    Exam (detailed: at least 9 elements; comprehensive: all 15 elements)   Constitutional  Vitals:  Most recent vital signs, dated less than 90 days prior to this appointment, were reviewed.   There were no vitals filed for this visit.       General:  unremarkable, age appropriate     Musculoskeletal  Muscle Strength/Tone:  Within  normal limits   Gait & Station:  non-ataxic     Psychiatric  Speech:  no latency; no press   Mood & Affect:  euthymic  congruent and appropriate   Thought Process:  normal and logical   Associations:  intact   Thought Content:  no suicidality, no homicidality, delusions, or paranoia   Insight:  intact, has awareness of illness   Judgement: behavior is adequate to circumstances   Orientation:  grossly intact   Memory: intact for content of interview   Language: grossly intact   Attention Span & Concentration:  able to focus   Fund of Knowledge:  intact and appropriate to age and level of education     Assessment and Diagnosis   18 y.o. female with ADHD presenting for medication management and follow up s/p Psychological testing for focus and concentration issues.   -Nov 2022: Initial Visit--discussed ADHD testing.  -January: 2023: Started Straterra trial  -March 2023: Minimal improvement with Strattera plus side effects-discontinued; initiated Adderall XR.   -April 2023: Improvement with Adderall XR 10mg, will increase dose   -May 2023 - present: Patient feels comfortable with current Adderall XR 20 mg dose without issues    General Impression:    ICD-10-CM ICD-9-CM   1. Attention deficit hyperactivity disorder (ADHD), unspecified ADHD type  F90.9 314.01         Intervention/Counseling/Treatment Plan   Psych Meds:  Continue Adderall XR 20 mg PO Daily, LAPMP Reviewed, no concerns or abnormalities noted    Discussed below risks of stimulant with patient    Increased risk for heart problems, high blood pressure, and sudden cardiac death.   Decreased appetite. People seem to be less hungry during the middle of the day, but they are often hungry by dinnertime as the medication wears off.   Sleep problems. If a person cannot fall asleep, the doctor may prescribe a lower dose. The doctor might also suggest that the medication is taken earlier in the day, or stop the afternoon or evening dose.    Stomach aches and  headaches.   Tics. A few people develop sudden, repetitive movements or sounds called tics. These tics may or may not be noticeable. Changing the medication dosage may make tics go away. Some people also may appear to have a personality change, such as appearing flat or without emotion. Talk with your doctor if you see any of these side effects.  Stimulant medications may lead to drug abuse or dependence, but the risk is highest for those patient taking this class of medication who do not have ADHD. Research shows that teens with ADHD who took stimulant medications were less likely to abuse drugs than those who did not take stimulant medications. Proper diagnosis is the key to minimizing this risk.  Discussed with female patients that they will need to be off of stimulant if trying to become pregnant or become pregnant.    Instructions:  Take all medications as prescribed.    Abstain from recreational drugs and alcohol.  Present to ED or call 911 for SI/HI plan or intent, psychosis, or medical emergency.      Return to Clinic: 2 months      Ian Coleman MD  [facility], PGY-3

## 2024-03-14 ENCOUNTER — PATIENT MESSAGE (OUTPATIENT)
Dept: OBSTETRICS AND GYNECOLOGY | Facility: CLINIC | Age: 19
End: 2024-03-14
Payer: COMMERCIAL

## 2024-03-27 NOTE — PROGRESS NOTES
STAFF COMMENTS: I have discussed pt with Dr. Coleman and reviewed the history and exam. I agree and concur with the assessment and plan.

## 2024-04-10 ENCOUNTER — TELEPHONE (OUTPATIENT)
Dept: OBSTETRICS AND GYNECOLOGY | Facility: CLINIC | Age: 19
End: 2024-04-10
Payer: COMMERCIAL

## 2024-04-10 ENCOUNTER — PATIENT MESSAGE (OUTPATIENT)
Dept: OBSTETRICS AND GYNECOLOGY | Facility: CLINIC | Age: 19
End: 2024-04-10
Payer: COMMERCIAL

## 2024-04-10 NOTE — TELEPHONE ENCOUNTER
----- Message from Danica Luna sent at 4/10/2024  4:18 PM CDT -----  Regarding: Reschedule Appt  Contact: Patient  Patient is scheduled for a virtual appt with physician on 04/11/2024   Patient stated she can not do appt at 8:15 due to school   Attempted to reschedule no appts generated   Please Assist     Patient can be reached at [phone]

## 2024-04-16 ENCOUNTER — OFFICE VISIT (OUTPATIENT)
Dept: PSYCHIATRY | Facility: CLINIC | Age: 19
End: 2024-04-16
Payer: COMMERCIAL

## 2024-04-16 VITALS
DIASTOLIC BLOOD PRESSURE: 63 MMHG | SYSTOLIC BLOOD PRESSURE: 110 MMHG | HEART RATE: 65 BPM | WEIGHT: 92.06 LBS | BODY MASS INDEX: 18.59 KG/M2

## 2024-04-16 DIAGNOSIS — F90.9 ATTENTION DEFICIT HYPERACTIVITY DISORDER (ADHD), UNSPECIFIED ADHD TYPE: ICD-10-CM

## 2024-04-16 PROCEDURE — 1159F MED LIST DOCD IN RCRD: CPT | Mod: CPTII,S$GLB,, | Performed by: STUDENT IN AN ORGANIZED HEALTH CARE EDUCATION/TRAINING PROGRAM

## 2024-04-16 PROCEDURE — 99213 OFFICE O/P EST LOW 20 MIN: CPT | Mod: S$GLB,,, | Performed by: STUDENT IN AN ORGANIZED HEALTH CARE EDUCATION/TRAINING PROGRAM

## 2024-04-16 PROCEDURE — 3074F SYST BP LT 130 MM HG: CPT | Mod: CPTII,S$GLB,, | Performed by: STUDENT IN AN ORGANIZED HEALTH CARE EDUCATION/TRAINING PROGRAM

## 2024-04-16 PROCEDURE — 1160F RVW MEDS BY RX/DR IN RCRD: CPT | Mod: CPTII,S$GLB,, | Performed by: STUDENT IN AN ORGANIZED HEALTH CARE EDUCATION/TRAINING PROGRAM

## 2024-04-16 PROCEDURE — 99999 PR PBB SHADOW E&M-EST. PATIENT-LVL II: CPT | Mod: PBBFAC,,, | Performed by: STUDENT IN AN ORGANIZED HEALTH CARE EDUCATION/TRAINING PROGRAM

## 2024-04-16 PROCEDURE — 3078F DIAST BP <80 MM HG: CPT | Mod: CPTII,S$GLB,, | Performed by: STUDENT IN AN ORGANIZED HEALTH CARE EDUCATION/TRAINING PROGRAM

## 2024-04-16 PROCEDURE — 3008F BODY MASS INDEX DOCD: CPT | Mod: CPTII,S$GLB,, | Performed by: STUDENT IN AN ORGANIZED HEALTH CARE EDUCATION/TRAINING PROGRAM

## 2024-04-16 RX ORDER — DEXTROAMPHETAMINE SACCHARATE, AMPHETAMINE ASPARTATE MONOHYDRATE, DEXTROAMPHETAMINE SULFATE AND AMPHETAMINE SULFATE 5; 5; 5; 5 MG/1; MG/1; MG/1; MG/1
20 CAPSULE, EXTENDED RELEASE ORAL DAILY
Qty: 30 CAPSULE | Refills: 0 | Status: SHIPPED | OUTPATIENT
Start: 2024-05-03 | End: 2024-06-05 | Stop reason: SDUPTHER

## 2024-04-16 NOTE — PROGRESS NOTES
"Outpatient Psychiatry Follow-Up Visit (MD/NP)  4/16/2024  Clinical Status of Patient:  Outpatient (Ambulatory)      Chief Complaint:  Laura Erazo is a 18 y.o. female who presents today for follow-up of  ADHD . Met with patient.        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 She has been feeling "burn out" balancing responsibilities of class work and clinicals. She denies any symptoms of anxiety or depression. She denies any side effects from medication. Endorses good sleep and appetite. Patient believes medication changes are not necessary and feels her recent academic struggles are more due to external factors instead of inadequate symptom control. Will continue to monitor for signs/symptoms warranting medication changes in future.      Review of Systems   Psychiatric Review of Systems-is patient experiencing or having changes in  sleep: no  appetite: no  weight: no  energy/anergy: no  anhedonia: no  libido: did not assess  anxiety: no  guilty/hopelessness: no  concentration: yes, see chart review  Irritability: no  Paranoia/delusions: no  S.I.B.s/risky behavior: no    Medical ROS: See HPI    Past Medical, Family and Social History: The patient's past medical, family and social history have been reviewed and updated as appropriate within the electronic medical record - see encounter notes.    Current Meds: Adderall 20 mg XR  Compliance: yes    Previous Medications/Side effects   - Straterra: caused constipation       Risk Parameters:  Patient reports no suicidal ideation  Patient reports no homicidal ideation  Patient reports no self-injurious behavior  Patient reports no violent behavior    Exam (detailed: at least 9 elements; comprehensive: all 15 elements)   Constitutional  Vitals:  Most recent vital signs, dated less than 90 days prior to this appointment, were reviewed.   Vitals:    04/16/24 1128   BP: 110/63 "   Pulse: 65   Weight: 41.7 kg (92 lb 0.7 oz)          General:  unremarkable, age appropriate     Musculoskeletal  Muscle Strength/Tone:  Within normal limits   Gait & Station:  non-ataxic     Psychiatric  Speech:  no latency; no press   Mood & Affect:  euthymic  congruent and appropriate   Thought Process:  normal and logical   Associations:  intact   Thought Content:  no suicidality, no homicidality, delusions, or paranoia   Insight:  intact, has awareness of illness   Judgement: behavior is adequate to circumstances   Orientation:  grossly intact   Memory: intact for content of interview   Language: grossly intact   Attention Span & Concentration:  able to focus   Fund of Knowledge:  intact and appropriate to age and level of education     Assessment and Diagnosis   18 y.o. female with ADHD presenting for medication management and follow up s/p Psychological testing for focus and concentration issues.   -Nov 2022: Initial Visit--discussed ADHD testing.  -January: 2023: Started Straterra trial  -March 2023: Minimal improvement with Strattera plus side effects-discontinued; initiated Adderall XR.   -April 2023: Improvement with Adderall XR 10mg, will increase dose   -May 2023 - present: Patient feels comfortable with current Adderall XR 20 mg dose without issues    General Impression:    ICD-10-CM ICD-9-CM   1. Attention deficit hyperactivity disorder (ADHD), unspecified ADHD type  F90.9 314.01         Intervention/Counseling/Treatment Plan   Psych Meds:  Continue Adderall XR 20 mg PO Daily, LAPMP Reviewed, no concerns or abnormalities noted    Discussed below risks of stimulant with patient    Increased risk for heart problems, high blood pressure, and sudden cardiac death.   Decreased appetite. People seem to be less hungry during the middle of the day, but they are often hungry by dinnertime as the medication wears off.   Sleep problems. If a person cannot fall asleep, the doctor may prescribe a lower dose. The  doctor might also suggest that the medication is taken earlier in the day, or stop the afternoon or evening dose.    Stomach aches and headaches.   Tics. A few people develop sudden, repetitive movements or sounds called tics. These tics may or may not be noticeable. Changing the medication dosage may make tics go away. Some people also may appear to have a personality change, such as appearing flat or without emotion. Talk with your doctor if you see any of these side effects.  Stimulant medications may lead to drug abuse or dependence, but the risk is highest for those patient taking this class of medication who do not have ADHD. Research shows that teens with ADHD who took stimulant medications were less likely to abuse drugs than those who did not take stimulant medications. Proper diagnosis is the key to minimizing this risk.  Discussed with female patients that they will need to be off of stimulant if trying to become pregnant or become pregnant.    Instructions:  Take all medications as prescribed.    Abstain from recreational drugs and alcohol.  Present to ED or call 911 for SI/HI plan or intent, psychosis, or medical emergency.      Return to Clinic: 2 months      Ian Coleman MD  [facility], PGY-3

## 2024-05-13 ENCOUNTER — OFFICE VISIT (OUTPATIENT)
Dept: OBSTETRICS AND GYNECOLOGY | Facility: CLINIC | Age: 19
End: 2024-05-13
Payer: COMMERCIAL

## 2024-05-13 VITALS — WEIGHT: 95.81 LBS | BODY MASS INDEX: 19.32 KG/M2 | HEIGHT: 59 IN

## 2024-05-13 DIAGNOSIS — N92.6 IRREGULAR PERIODS: Primary | ICD-10-CM

## 2024-05-13 LAB
B-HCG UR QL: NEGATIVE
CTP QC/QA: YES

## 2024-05-13 PROCEDURE — 3008F BODY MASS INDEX DOCD: CPT | Mod: CPTII,S$GLB,, | Performed by: STUDENT IN AN ORGANIZED HEALTH CARE EDUCATION/TRAINING PROGRAM

## 2024-05-13 PROCEDURE — 99213 OFFICE O/P EST LOW 20 MIN: CPT | Mod: S$GLB,,, | Performed by: STUDENT IN AN ORGANIZED HEALTH CARE EDUCATION/TRAINING PROGRAM

## 2024-05-13 PROCEDURE — 99999 PR PBB SHADOW E&M-EST. PATIENT-LVL III: CPT | Mod: PBBFAC,,, | Performed by: STUDENT IN AN ORGANIZED HEALTH CARE EDUCATION/TRAINING PROGRAM

## 2024-05-13 PROCEDURE — 81025 URINE PREGNANCY TEST: CPT | Mod: S$GLB,,, | Performed by: STUDENT IN AN ORGANIZED HEALTH CARE EDUCATION/TRAINING PROGRAM

## 2024-05-13 PROCEDURE — 1159F MED LIST DOCD IN RCRD: CPT | Mod: CPTII,S$GLB,, | Performed by: STUDENT IN AN ORGANIZED HEALTH CARE EDUCATION/TRAINING PROGRAM

## 2024-06-04 ENCOUNTER — PATIENT MESSAGE (OUTPATIENT)
Dept: PSYCHIATRY | Facility: CLINIC | Age: 19
End: 2024-06-04
Payer: COMMERCIAL

## 2024-06-05 ENCOUNTER — OFFICE VISIT (OUTPATIENT)
Dept: PSYCHIATRY | Facility: CLINIC | Age: 19
End: 2024-06-05
Payer: COMMERCIAL

## 2024-06-05 DIAGNOSIS — F90.9 ATTENTION DEFICIT HYPERACTIVITY DISORDER (ADHD), UNSPECIFIED ADHD TYPE: ICD-10-CM

## 2024-06-05 PROCEDURE — 99213 OFFICE O/P EST LOW 20 MIN: CPT | Mod: 95,,, | Performed by: STUDENT IN AN ORGANIZED HEALTH CARE EDUCATION/TRAINING PROGRAM

## 2024-06-05 PROCEDURE — 1160F RVW MEDS BY RX/DR IN RCRD: CPT | Mod: CPTII,95,, | Performed by: STUDENT IN AN ORGANIZED HEALTH CARE EDUCATION/TRAINING PROGRAM

## 2024-06-05 PROCEDURE — 1159F MED LIST DOCD IN RCRD: CPT | Mod: CPTII,95,, | Performed by: STUDENT IN AN ORGANIZED HEALTH CARE EDUCATION/TRAINING PROGRAM

## 2024-06-05 RX ORDER — DEXTROAMPHETAMINE SACCHARATE, AMPHETAMINE ASPARTATE MONOHYDRATE, DEXTROAMPHETAMINE SULFATE AND AMPHETAMINE SULFATE 5; 5; 5; 5 MG/1; MG/1; MG/1; MG/1
20 CAPSULE, EXTENDED RELEASE ORAL DAILY
Qty: 30 CAPSULE | Refills: 0 | Status: SHIPPED | OUTPATIENT
Start: 2024-07-09 | End: 2024-08-08

## 2024-06-05 RX ORDER — DEXTROAMPHETAMINE SACCHARATE, AMPHETAMINE ASPARTATE MONOHYDRATE, DEXTROAMPHETAMINE SULFATE AND AMPHETAMINE SULFATE 5; 5; 5; 5 MG/1; MG/1; MG/1; MG/1
20 CAPSULE, EXTENDED RELEASE ORAL DAILY
Qty: 30 CAPSULE | Refills: 0 | Status: SHIPPED | OUTPATIENT
Start: 2024-08-09 | End: 2024-09-08

## 2024-06-05 RX ORDER — DEXTROAMPHETAMINE SACCHARATE, AMPHETAMINE ASPARTATE MONOHYDRATE, DEXTROAMPHETAMINE SULFATE AND AMPHETAMINE SULFATE 5; 5; 5; 5 MG/1; MG/1; MG/1; MG/1
20 CAPSULE, EXTENDED RELEASE ORAL DAILY
Qty: 30 CAPSULE | Refills: 0 | Status: SHIPPED | OUTPATIENT
Start: 2024-06-09 | End: 2024-07-14

## 2024-06-05 NOTE — PROGRESS NOTES
Outpatient Psychiatry Follow-Up Visit (MD/NP)      TELE-HEALTH / VIRTUAL VISIT:     The patient location is: [address] [place] [address] in the [facility], in the state of [place].    Visit type: audiovisual    Face to Face time with patient: 20 minutes    Each patient who is provided medical services by telemedicine is: (1) informed of the relationship between the physician and patient and the respective role of any other health care provider with respect to management of the patient; and (2) notified that he or she may decline to receive medical services by telemedicine and may withdraw from such care at any time.    For Audio Only Telehealth Visits: the reason for the audio only service rather than synchronous audio and video virtual visit was related to technical difficulties or patient preference/necessity.  This service was not originating from a related E/M service provided within the previous 7 days nor will  to an E/M service or procedure within the next 24 hours or my soonest available appointment.  Prevailing standard of care was able to be met in this audio-only visit. Patient verbally consented to receive this service via voice-only telephone call.      Clinical Status of Patient:  Outpatient (Ambulatory)    Chief Complaint:  Laura Erazo is a 18 y.o. female who presents today for follow-up of  ADHD . Met with patient.        Interval History and Content of Current Session:  Patient states everything has been going well since last appointment. 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 Endorses good symptom control with Adderall XR. Denies any side effects from the medication. Endorses good sleep and appetite. Denies any other questions or concerns at this time.       Review of Systems   Psychiatric Review of Systems-is patient experiencing or having changes in  sleep: no  appetite: no  weight: no  energy/anergy: no  anhedonia: no  libido:  did not assess  anxiety: no  guilty/hopelessness: no  concentration: yes, see chart review  Irritability: no  Paranoia/delusions: no  S.I.B.s/risky behavior: no    Medical ROS: See HPI    Past Medical, Family and Social History: The patient's past medical, family and social history have been reviewed and updated as appropriate within the electronic medical record - see encounter notes.    Current Meds: Adderall 20 mg XR  Compliance: yes    Previous Medications/Side effects   - Straterra: caused constipation       Risk Parameters:  Patient reports no suicidal ideation  Patient reports no homicidal ideation  Patient reports no self-injurious behavior  Patient reports no violent behavior    Exam (detailed: at least 9 elements; comprehensive: all 15 elements)   Constitutional  Vitals:  Most recent vital signs, dated less than 90 days prior to this appointment, were reviewed.   There were no vitals filed for this visit.         General:  unremarkable, age appropriate     Musculoskeletal  Muscle Strength/Tone:  Within normal limits   Gait & Station:  non-ataxic     Psychiatric  Speech:  no latency; no press   Mood & Affect:  euthymic  congruent and appropriate   Thought Process:  normal and logical   Associations:  intact   Thought Content:  no suicidality, no homicidality, delusions, or paranoia   Insight:  intact, has awareness of illness   Judgement: behavior is adequate to circumstances   Orientation:  grossly intact   Memory: intact for content of interview   Language: grossly intact   Attention Span & Concentration:  able to focus   Fund of Knowledge:  intact and appropriate to age and level of education     Assessment and Diagnosis   18 y.o. female with ADHD presenting for medication management and follow up s/p Psychological testing for focus and concentration issues.   -Nov 2022: Initial Visit--discussed ADHD testing.  -January: 2023: Started Straterra trial  -March 2023: Minimal improvement with Strattera plus  side effects-discontinued; initiated Adderall XR.   -April 2023: Improvement with Adderall XR 10mg, will increase dose   -May 2023 - present: Patient feels comfortable with current Adderall XR 20 mg dose without issues    General Impression:    ICD-10-CM ICD-9-CM   1. Attention deficit hyperactivity disorder (ADHD), unspecified ADHD type  F90.9 314.01         Intervention/Counseling/Treatment Plan   Psych Meds:  Continue Adderall XR 20 mg PO Daily, LAPMP Reviewed, no concerns or abnormalities noted    Discussed below risks of stimulant with patient    Increased risk for heart problems, high blood pressure, and sudden cardiac death.   Decreased appetite. People seem to be less hungry during the middle of the day, but they are often hungry by dinnertime as the medication wears off.   Sleep problems. If a person cannot fall asleep, the doctor may prescribe a lower dose. The doctor might also suggest that the medication is taken earlier in the day, or stop the afternoon or evening dose.    Stomach aches and headaches.   Tics. A few people develop sudden, repetitive movements or sounds called tics. These tics may or may not be noticeable. Changing the medication dosage may make tics go away. Some people also may appear to have a personality change, such as appearing flat or without emotion. Talk with your doctor if you see any of these side effects.  Stimulant medications may lead to drug abuse or dependence, but the risk is highest for those patient taking this class of medication who do not have ADHD. Research shows that teens with ADHD who took stimulant medications were less likely to abuse drugs than those who did not take stimulant medications. Proper diagnosis is the key to minimizing this risk.  Discussed with female patients that they will need to be off of stimulant if trying to become pregnant or become pregnant.    Instructions:  Take all medications as prescribed.    Abstain from recreational drugs and  alcohol.  Present to ED or call 911 for SI/HI plan or intent, psychosis, or medical emergency.      Return to Clinic: 3 months, aware of resident transition, will follow up with new provider      Ian Coleman MD  [facility], PGY-3

## 2024-07-22 ENCOUNTER — OFFICE VISIT (OUTPATIENT)
Dept: SURGERY | Facility: CLINIC | Age: 19
End: 2024-07-22
Payer: COMMERCIAL

## 2024-07-22 VITALS
DIASTOLIC BLOOD PRESSURE: 66 MMHG | HEART RATE: 68 BPM | BODY MASS INDEX: 19.2 KG/M2 | SYSTOLIC BLOOD PRESSURE: 122 MMHG | WEIGHT: 95.25 LBS | HEIGHT: 59 IN

## 2024-07-22 DIAGNOSIS — K62.89 ANAL PAIN: ICD-10-CM

## 2024-07-22 DIAGNOSIS — K64.8 INTERNAL BLEEDING HEMORRHOIDS: ICD-10-CM

## 2024-07-22 DIAGNOSIS — K64.8 HEMORRHOID PROLAPSE: Primary | ICD-10-CM

## 2024-07-22 PROCEDURE — 99204 OFFICE O/P NEW MOD 45 MIN: CPT | Mod: 25,S$GLB,, | Performed by: NURSE PRACTITIONER

## 2024-07-22 PROCEDURE — 1160F RVW MEDS BY RX/DR IN RCRD: CPT | Mod: CPTII,S$GLB,, | Performed by: NURSE PRACTITIONER

## 2024-07-22 PROCEDURE — 3008F BODY MASS INDEX DOCD: CPT | Mod: CPTII,S$GLB,, | Performed by: NURSE PRACTITIONER

## 2024-07-22 PROCEDURE — 1159F MED LIST DOCD IN RCRD: CPT | Mod: CPTII,S$GLB,, | Performed by: NURSE PRACTITIONER

## 2024-07-22 PROCEDURE — 3078F DIAST BP <80 MM HG: CPT | Mod: CPTII,S$GLB,, | Performed by: NURSE PRACTITIONER

## 2024-07-22 PROCEDURE — 99999 PR PBB SHADOW E&M-EST. PATIENT-LVL III: CPT | Mod: PBBFAC,,, | Performed by: NURSE PRACTITIONER

## 2024-07-22 PROCEDURE — 46221 LIGATION OF HEMORRHOID(S): CPT | Mod: S$GLB,,, | Performed by: NURSE PRACTITIONER

## 2024-07-22 PROCEDURE — 3074F SYST BP LT 130 MM HG: CPT | Mod: CPTII,S$GLB,, | Performed by: NURSE PRACTITIONER

## 2024-07-22 RX ORDER — HYDROCORTISONE 25 MG/G
CREAM TOPICAL 2 TIMES DAILY
Qty: 28 G | Refills: 2 | Status: SHIPPED | OUTPATIENT
Start: 2024-07-22

## 2024-07-22 RX ORDER — OXYCODONE HYDROCHLORIDE 5 MG/1
5 TABLET ORAL EVERY 6 HOURS PRN
Qty: 10 TABLET | Refills: 0 | Status: SHIPPED | OUTPATIENT
Start: 2024-07-22

## 2024-07-22 NOTE — PROGRESS NOTES
"CRS Office Visit History and Physical    Referring Md:   Self, Aaareflindy  No address on file    SUBJECTIVE:     Chief Complaint: recurring internal hemorrhoid    History of Present Illness:  The patient is new patient to this practice.   Course is as follows:  Patient is a 19 y.o. female presents with recurrent internal bleeding hemorrhoids.  Symptoms have been present for 8 months.  Has tried prep h suppositories with improvement however need to continually use.  Associated bleeding: yes  Previous anorectal procedures: No  denies currently straining/prolonged time on toilet with bowel movements. However endorses hx of this.  +weight lifting, +hx of cycling  is not currently taking fiber supplement or stool softener. Taking probiotics.  Hx of constipation, however since probiotics, now with daily vs qod bms.   Blood thinners: No    Last Colonoscopy: none  Family history of colorectal cancer or IBD: none.    Review of patient's allergies indicates:  No Known Allergies    Past Medical History:   Diagnosis Date    Reactive airway disease     1-2 times /year     No past surgical history on file.  Family History   Problem Relation Name Age of Onset    Cancer Maternal Grandfather      Hyperlipidemia Maternal Grandfather      Heart disease Paternal Grandfather          smoker with early ds    No Known Problems Mother miguelangel     No Known Problems Father sienna     Heart disease Maternal Grandmother      Thyroid disease Maternal Grandmother       Social History     Tobacco Use    Smoking status: Never    Smokeless tobacco: Never   Substance Use Topics    Alcohol use: Yes    Drug use: Never        Review of Systems:  Review of Systems   Gastrointestinal:  Negative for diarrhea.        Rectal bleeding   Skin:  Negative for itching.       OBJECTIVE:     Vital Signs (Most Recent)  Blood Pressure 122/66 (BP Location: Right arm, Patient Position: Sitting, BP Method: Medium (Automatic))   Pulse 68   Height 4' 11" (1.499 m)   " Weight 43.2 kg (95 lb 3.8 oz)   Body Mass Index 19.24 kg/m²     Physical Exam:  General: White female in no distress   Neuro: Alert and oriented to person, place, and time.  Moves all extremities.     HEENT: No icterus.  Trachea midline  Respiratory: Respirations are even and unlabored, no cough or audible wheezing  Skin: Warm dry and intact, No visible rashes, no jaundice    Labs reviewed today:  Lab Results   Component Value Date    WBC 5.82 11/09/2022    HGB 10.8 (L) 11/09/2022    HCT 34.0 (L) 11/09/2022     11/09/2022    CHOL 171 08/11/2014    HDL 62 08/11/2014    ALT 11 11/09/2022    AST 15 11/09/2022     11/09/2022    K 3.9 11/09/2022     11/09/2022    CREATININE 0.8 11/09/2022    BUN 6 11/09/2022    CO2 22 (L) 11/09/2022    TSH 0.441 11/09/2022       Imaging reviewed today:  none    Endoscopy reviewed today:  none    Anorectal Exam:    Anal Skin: Normal    Digital Rectal Exam:  Resting Tone normal  Mass none  Tenderness  absent    Anoscopy:  Verbal consent was obtained.   Clear plastic anoscope inserted.    Hemorrhoids  present  Stigmata of bleeding  present  Stigmata of prolapsed  absent  Distal rectal mucosa normal    Rubber Band Ligation:  Suction applicator was placed above the dentate line.   Rubber bands were deployed in the RPL and RAL position.    Patient tolerated the procedure well.       ASSESSMENT/PLAN:     Diagnoses and all orders for this visit:    Hemorrhoid prolapse    Internal bleeding hemorrhoids  -     hydrocortisone (ANUSOL-HC) 2.5 % rectal cream; Place rectally 2 (two) times daily.    Anal pain  -     oxyCODONE (ROXICODONE) 5 MG immediate release tablet; Take 1 tablet (5 mg total) by mouth every 6 (six) hours as needed for Pain.        The patient was instructed to:  Anusol bid for 12 days prn hemorrhoids  Pain medication, tylenol, ibuprofen, warm soaks prn pain.  ED precautions reviewed.   Increase water intake to at least 8-10 glasses of water per day.  Take a daily  fiber supplement (Konsyl, Benefiber, Metamucil) and increase dietary intake to 20-30 grams/day.  Avoid straining or spending >5min/event with bowel movements.  Follow-up in clinic in 6-8 weeks if repeat rbl desired.      Elsie Griffin, EMERSONP-C  Colon and Rectal Surgery

## 2024-07-22 NOTE — PATIENT INSTRUCTIONS
ER if bleeding > 1 cup of blood, inability to urinate in > 6 hrs, or fever > 101.  Pain medication, tylenol, ibuprofen, warm soaks as needed for pain.  Daily fiber supplement, water intake > 64 oz, no sitting/straining > 5 mins with bowel movement.  May need to repeat in 6-8 weeks.

## 2024-08-13 ENCOUNTER — PATIENT MESSAGE (OUTPATIENT)
Dept: OBSTETRICS AND GYNECOLOGY | Facility: CLINIC | Age: 19
End: 2024-08-13
Payer: COMMERCIAL

## 2024-09-03 NOTE — PROGRESS NOTES
"OBSTETRICS AND GYNECOLOGY    Subjective:      Chief Complaint:  Secondary Amenorrhea    HPI:  Laura Erazo is an 19 y.o. P0 presenting with concerns of secondary amenorrhea. No spotting. No moliminal symptoms. No acute complaints.    Review of systems:  Denies abnormal vaginal bleeding or discharge.     OB History   No obstetric history on file.     Past Medical History:   Diagnosis Date    Reactive airway disease     1-2 times /year     History reviewed. No pertinent surgical history.  Family History   Problem Relation Name Age of Onset    Cancer Maternal Grandfather      Hyperlipidemia Maternal Grandfather      Heart disease Paternal Grandfather          smoker with early ds    No Known Problems Mother miguelangel     No Known Problems Father sienna     Heart disease Maternal Grandmother      Thyroid disease Maternal Grandmother         Allergies: Review of patient's allergies indicates:  No Known Allergies    Medications:   Current Outpatient Medications   Medication Sig Dispense Refill    dextroamphetamine-amphetamine (ADDERALL XR) 20 MG 24 hr capsule Take 1 capsule (20 mg total) by mouth once daily. 30 capsule 0    hydrocortisone (ANUSOL-HC) 2.5 % rectal cream Place rectally 2 (two) times daily. 28 g 2    WINLEVI 1 % Crea APPLY TO WHOLE FACE TWICE DAILY.      levalbuterol (XOPENEX) 0.63 mg/3 mL nebulizer solution Take 1 ampule by nebulization every 4 (four) hours as needed. (Patient not taking: Reported on 5/13/2024)       No current facility-administered medications for this visit.       Social History     Tobacco Use    Smoking status: Never    Smokeless tobacco: Never   Substance Use Topics    Alcohol use: Yes       Objective:   /62 (BP Location: Left arm, Patient Position: Sitting)   Ht 4' 11" (1.499 m)   Wt 42 kg (92 lb 9.5 oz)   BMI 18.70 kg/m²   Physical Exam    GENERAL: Alert, well dressed, well nourished. Appropriate mood and affect. Pleasant.  HEENT: Normocephalic, atraumatic. Extraocular " movements intact. Hearing and vision grossly intact.   PULMONARY: No respiratory distress. No use of accessory muscles of respiration. No cyanosis. Speaking comfortably in full sentences.   CARDIAC: Well perfused.    ABDOMEN: Soft.    EXTREMITIES: No edema. No visible rashes. No hrsutism, acne.    PELVIC:   EXTERNAL GENITALIA: No lesions or masses, non-erythematous.  URETHRA: Patent, no discharge.   VAGINA: Pink, moist, rugated, no discharge.   CERVIX: Nulliparous, no lesions or masses, os closed, no blood or discharge per os, no cervical motion tenderness.   UTERUS: Firm, mobile, midline, 6 weeks size, non-tender.   ADNEXA: Non-tender, non-palpable.    Assessment/Plan:     - Ddx:  Primary ovarian insufficiency, late/nonclassical CAH, hypothalamic dysfunction, pituitary dysfunction, anovulation, hyperprolactinemia, Asherman syndrome, Claritza syndrome, PCOS, Cushing, ovarian mass, idiopathic    Stopped OCPs last fall. Did not like how it made her feel. Menses took several months to return. No menses until 4/2024. In the past, menses have also been about q7 months.   History of oligomenorrhea.  No pelvic surgeries in the past.   Medication list reviewed.   Denies increased acne/excess hair.  Denies galactorrhea.   Stress level - nursing school.   Endorses healthy appetite.   BMI 19  - Obtain labs   - STD screening - declines  - Pregnancy test  - TVUS  - Consider progesterone challenge pending the above  - Recommend vitamin D, calcium supplement          As of April 1, 2021, the Cures Act has been passed nationally. This new law requires that all doctors progress notes, lab results, pathology reports and radiology reports be released IMMEDIATELY to the patient in the patient portal. That means that the results are released to you at the EXACT same time they are released to me. Therefore, with all of the patients that I have I am not able to reply to each patient exactly when the results come in. So there will be a  delay from when you see the results to when I see them and have time to come up with a response to send you. Also I only see these results when I am on the computer at work. So if the results come in over the weekend or after 5 pm of a work day, I will not see them until the next business day. As you can tell, this is a challenge as a physician to give every patient the quick response they hope for and deserve. So please be patient!   Thanks for your understanding and patience.

## 2024-09-04 ENCOUNTER — OFFICE VISIT (OUTPATIENT)
Dept: OBSTETRICS AND GYNECOLOGY | Facility: CLINIC | Age: 19
End: 2024-09-04
Payer: COMMERCIAL

## 2024-09-04 ENCOUNTER — LAB VISIT (OUTPATIENT)
Dept: LAB | Facility: HOSPITAL | Age: 19
End: 2024-09-04
Attending: STUDENT IN AN ORGANIZED HEALTH CARE EDUCATION/TRAINING PROGRAM
Payer: COMMERCIAL

## 2024-09-04 VITALS
DIASTOLIC BLOOD PRESSURE: 62 MMHG | WEIGHT: 92.56 LBS | HEIGHT: 59 IN | SYSTOLIC BLOOD PRESSURE: 118 MMHG | BODY MASS INDEX: 18.66 KG/M2

## 2024-09-04 DIAGNOSIS — N91.1 SECONDARY AMENORRHEA: Primary | ICD-10-CM

## 2024-09-04 DIAGNOSIS — N91.1 SECONDARY AMENORRHEA: ICD-10-CM

## 2024-09-04 LAB
DHEA-S SERPL-MCNC: 293 UG/DL (ref 134.2–407.4)
ESTRADIOL SERPL-MCNC: 85 PG/ML
FSH SERPL-ACNC: 8.99 MIU/ML
HCG INTACT+B SERPL-ACNC: <2.4 MIU/ML
LH SERPL-ACNC: 15.1 MIU/ML
PROLACTIN SERPL IA-MCNC: 11.4 NG/ML (ref 5.2–26.5)
TSH SERPL DL<=0.005 MIU/L-ACNC: 0.7 UIU/ML (ref 0.4–4)

## 2024-09-04 PROCEDURE — 99214 OFFICE O/P EST MOD 30 MIN: CPT | Mod: S$GLB,,, | Performed by: STUDENT IN AN ORGANIZED HEALTH CARE EDUCATION/TRAINING PROGRAM

## 2024-09-04 PROCEDURE — 99999 PR PBB SHADOW E&M-EST. PATIENT-LVL III: CPT | Mod: PBBFAC,,, | Performed by: STUDENT IN AN ORGANIZED HEALTH CARE EDUCATION/TRAINING PROGRAM

## 2024-09-04 PROCEDURE — 83498 ASY HYDROXYPROGESTERONE 17-D: CPT | Performed by: STUDENT IN AN ORGANIZED HEALTH CARE EDUCATION/TRAINING PROGRAM

## 2024-09-04 PROCEDURE — 1159F MED LIST DOCD IN RCRD: CPT | Mod: CPTII,S$GLB,, | Performed by: STUDENT IN AN ORGANIZED HEALTH CARE EDUCATION/TRAINING PROGRAM

## 2024-09-04 PROCEDURE — 82670 ASSAY OF TOTAL ESTRADIOL: CPT | Performed by: STUDENT IN AN ORGANIZED HEALTH CARE EDUCATION/TRAINING PROGRAM

## 2024-09-04 PROCEDURE — 3008F BODY MASS INDEX DOCD: CPT | Mod: CPTII,S$GLB,, | Performed by: STUDENT IN AN ORGANIZED HEALTH CARE EDUCATION/TRAINING PROGRAM

## 2024-09-04 PROCEDURE — 3078F DIAST BP <80 MM HG: CPT | Mod: CPTII,S$GLB,, | Performed by: STUDENT IN AN ORGANIZED HEALTH CARE EDUCATION/TRAINING PROGRAM

## 2024-09-04 PROCEDURE — 3074F SYST BP LT 130 MM HG: CPT | Mod: CPTII,S$GLB,, | Performed by: STUDENT IN AN ORGANIZED HEALTH CARE EDUCATION/TRAINING PROGRAM

## 2024-09-04 PROCEDURE — 82627 DEHYDROEPIANDROSTERONE: CPT | Performed by: STUDENT IN AN ORGANIZED HEALTH CARE EDUCATION/TRAINING PROGRAM

## 2024-09-04 PROCEDURE — 83002 ASSAY OF GONADOTROPIN (LH): CPT | Performed by: STUDENT IN AN ORGANIZED HEALTH CARE EDUCATION/TRAINING PROGRAM

## 2024-09-04 PROCEDURE — 84402 ASSAY OF FREE TESTOSTERONE: CPT | Performed by: STUDENT IN AN ORGANIZED HEALTH CARE EDUCATION/TRAINING PROGRAM

## 2024-09-04 PROCEDURE — 83001 ASSAY OF GONADOTROPIN (FSH): CPT | Performed by: STUDENT IN AN ORGANIZED HEALTH CARE EDUCATION/TRAINING PROGRAM

## 2024-09-04 PROCEDURE — 36415 COLL VENOUS BLD VENIPUNCTURE: CPT | Performed by: STUDENT IN AN ORGANIZED HEALTH CARE EDUCATION/TRAINING PROGRAM

## 2024-09-04 PROCEDURE — 84443 ASSAY THYROID STIM HORMONE: CPT | Performed by: STUDENT IN AN ORGANIZED HEALTH CARE EDUCATION/TRAINING PROGRAM

## 2024-09-04 PROCEDURE — 84702 CHORIONIC GONADOTROPIN TEST: CPT | Performed by: STUDENT IN AN ORGANIZED HEALTH CARE EDUCATION/TRAINING PROGRAM

## 2024-09-04 PROCEDURE — 84146 ASSAY OF PROLACTIN: CPT | Performed by: STUDENT IN AN ORGANIZED HEALTH CARE EDUCATION/TRAINING PROGRAM

## 2024-09-04 RX ORDER — CLASCOTERONE 1 G/100G
CREAM TOPICAL
COMMUNITY
Start: 2024-08-19

## 2024-09-09 LAB
17OHP SERPL-MCNC: 177 NG/DL (ref 35–413)
TESTOST FREE SERPL-MCNC: 0.7 PG/ML

## 2024-09-27 ENCOUNTER — HOSPITAL ENCOUNTER (OUTPATIENT)
Dept: RADIOLOGY | Facility: HOSPITAL | Age: 19
Discharge: HOME | End: 2024-09-27
Attending: STUDENT IN AN ORGANIZED HEALTH CARE EDUCATION/TRAINING PROGRAM
Payer: COMMERCIAL

## 2024-09-27 ENCOUNTER — PATIENT MESSAGE (OUTPATIENT)
Dept: OBSTETRICS AND GYNECOLOGY | Facility: CLINIC | Age: 19
End: 2024-09-27
Payer: COMMERCIAL

## 2024-09-27 DIAGNOSIS — N91.1 SECONDARY AMENORRHEA: ICD-10-CM

## 2024-09-27 PROCEDURE — 76830 TRANSVAGINAL US NON-OB: CPT | Mod: TC

## 2024-09-27 PROCEDURE — 76830 TRANSVAGINAL US NON-OB: CPT | Mod: 26,,, | Performed by: RADIOLOGY

## 2024-09-27 PROCEDURE — 76856 US EXAM PELVIC COMPLETE: CPT | Mod: 26,,, | Performed by: RADIOLOGY

## 2024-09-27 PROCEDURE — 76856 US EXAM PELVIC COMPLETE: CPT | Mod: TC

## 2024-10-07 ENCOUNTER — PATIENT MESSAGE (OUTPATIENT)
Dept: OBSTETRICS AND GYNECOLOGY | Facility: CLINIC | Age: 19
End: 2024-10-07
Payer: COMMERCIAL

## 2024-10-07 DIAGNOSIS — R10.2 PELVIC PAIN: Primary | ICD-10-CM

## 2024-10-07 NOTE — TELEPHONE ENCOUNTER
Ok referral placed.  They should reach out to her to schedule.  If she does not hear anything in 2 weeks please have her let usk now.  Glad to hear her cycle started!  Let's continue to track.  Please schedule a 3 month follow up with Dr. Keen or sooner if needed.  Thank you

## 2024-10-17 ENCOUNTER — CLINICAL SUPPORT (OUTPATIENT)
Dept: REHABILITATION | Facility: HOSPITAL | Age: 19
End: 2024-10-17
Attending: STUDENT IN AN ORGANIZED HEALTH CARE EDUCATION/TRAINING PROGRAM
Payer: COMMERCIAL

## 2024-10-17 DIAGNOSIS — R10.2 PELVIC PAIN: ICD-10-CM

## 2024-10-17 DIAGNOSIS — M62.89 PELVIC FLOOR DYSFUNCTION: Primary | ICD-10-CM

## 2024-10-17 PROCEDURE — 97162 PT EVAL MOD COMPLEX 30 MIN: CPT | Mod: PO

## 2024-10-17 PROCEDURE — 97110 THERAPEUTIC EXERCISES: CPT | Mod: PO

## 2024-10-17 NOTE — PROGRESS NOTES
"[facility]  Pelvic Health Physical Therapy Initial Evaluation    Date: 10/17/2024   [facility]  [facility] Number: [id number]  Therapy Diagnosis:   Encounter Diagnosis   Name Primary?    Pelvic floor dysfunction Yes     Physician: Noelle Finney, *    Physician Orders: PT Eval and Treat    Medical Diagnosis from Referral: Pelvic pain [R10.2]   Evaluation Date: 10/17/2024  Authorization Period Expiration: 10/7/2025  Plan of Care Expiration: 2025  Visit # / Visits authorized:     Time In: ***  Time Out: ***  Total Appointment Time (timed & untimed codes): *** minutes    Precautions: {AMB PT PELVIC FLOOR PRECAUTIONS:87537}    Subjective     Date of onset: ***    History of current condition - Laura reports: ***    OB/GYN History: ***  Sexually active? {YES No:}  Pain with vaginal exams, intercourse or tampon use? {YES NO:34556}    Bladder/Bowel History: {AMB PT PELVIC FLOOR BLADDER/BOWEL HISTORY:90791}  Frequency of urination:   Daytime: ***           Nighttime: ***  Difficulty initiating urine stream: {YES NO:17097}  Urine stream: {AMB PT URINE STREAM:92426}  Complete emptying: {YES NO:66394}  Bladder leakage: {YES NO:91084}  Frequency of incidents: ***  Amount leaked (urine): {Pelvic Floor Leakage:19452}  Urinary Urgency: {YES NO:90827}, Able to delay the urge for at least *** minute(s).  Frequency of bowel movements: {Frequency; infant stoolin}  Difficulty initiating BM: {YES NO:93894}  Quality/Shape of BM: {Blank single:::"[place] Stool Chart ***","***"}  Does Patient Feel Empty after BM? {YES No:01246}  Fiber Supplements or Laxative Use? {YES NO:73559}  Colon leakage: {YES NO:71617}  Frequency of incidents: ***   Amount leaked (bowels): {AMB PT PELVIC FLOOR ANAL LEAKAGE:49089}  Form of protection: {Blank single:::"none","panty liner","pad","brief","pad in brief","***"}  Number of pads required in 24 hours: ***    Pain:  Location: {Pain " "Loc:19997}  Current {0-10:05883::"0"}/10, worst {0-10:49853::"0"}/10, best {0-10:02121::"0"}/10   Description: {Pain Description:73582}  Aggravating Factors/Activities that cause symptoms: {Aggravating Factors:38920}   Easing Factors: {Pain (activities that relieve):22421}     Medical History: Laura  has a past medical history of Reactive airway disease.     Surgical History: Laura Erazo  has no past surgical history on file.    Medications: Laura has a current medication list which includes the following prescription(s): dextroamphetamine-amphetamine, hydrocortisone, levalbuterol, and winlevi.    Allergies: Review of patient's allergies indicates:  No Known Allergies     Prior Therapy/Previous treatment included: ***  Social History:  {LIVES WITH:54933} ***  Current exercise: ***  Occupation: Pt works as a *** and job-related duties include ***.  Prior Level of Function: ***  Current Level of Function: ***    Types of fluid intake: {Fluid intake:21973}  Diet: ***   Habitus: {habitus:45307}  Abuse/Neglect: {YES***/NO:93611}     Pts goals: ***    OBJECTIVE     See EMR under MEDIA for written consent provided 10/17/2024  Chaperone: ***    ORTHO SCREEN  Posture in sitting: {AMB PT PELVIC FLOOR ORTHO POSTURE SITTIN}  Posture in standing: {AMB PT PELVIC FLOOR ORTHO POSTURE STANDIN}  Pelvic alignment: {AMB PT PELVIC FLOOR SI JOINT ASSESSMENT:36467}     ABDOMINAL WALL ASSESSMENT  Palpation: ***  Abdominal strength: Rectus abdominus: {AMB PT VESTIBULAR STRENGTH:49852}     Transverse abdominus: ***  Scarring: ***  Diastasis: {PRESENT/ABSENT:69020} ***     BREATHING MECHANICS ASSESSMENT   Thorax Assessment During Quiet Respiration: {AMB PT PELVIC FLOOR BREATHIN}  Thorax Assessment During Deep Respiration: {AMB PT PELVIC FLOOR BREATHIN}    VAGINAL PELVIC FLOOR EXAM    EXTERNAL ASSESSMENT  Introitus: {AMB PT PELVIC FLOOR INTROITUS:75136}  Skin condition: {AMB PT PELVIC FLOOR SKIN " "CONDITION:94773}  Scarring: {AMB PT PELVIC FLOOR SCARRIN}   Sensation: {AMB PT PELVIC FLOOR SENSATION:23412}   Pain: {Blank single:::"none","Q tip test positive","***"} ***  Voluntary contraction: {AMB PT PELVIC FLOOR CONTRACTION RESPONSE:14187}  Voluntary relaxation: {AMB PT PELVIC FLOOR CONTRACTION RESPONSE:09780}  Involuntary contraction: {AMB PT PELVIC FLOOR CONTRACTION RESPONSE:41048}  Bearing down: {AMB PT PELVIC FLOOR CONTRACTION RESPONSE:04117}  Perineal descent: {Blank single:::"present","absent"}  Comments: ***      INTERNAL ASSESSMENT  Pain: {AMB PT PELVIC FLOOR INTERAL PAIN:21336}   Sensation: {Blank single:::"able to localized pressure appropriately","able to sense generalized pressure, unable to identify location","numbness noted ***","paresthesia noted ***"}   Vaginal vault: {AMB PT PELVIC FLOOR VAGINAL VAULT:45457}   Muscle Bulk: {AMB PT PELVIC FLOOR MUSCLE BULK:77848}   Muscle Power: ***/5  Muscle Endurance: *** sec  # Reps To Fatigue: ***    Fast Contractions in 10 seconds: ***     Quality of contraction: {AMB PT PELVIC FLOOR QUALITY OF CONTRACTION:64418}   Specificity: {AMB PT PELVIC FLOOR SPECIFICITY:47093}   Coordination: {AMB PT PELVIC FLOOR COORDINATION:64017}   Prolapse check: {AMB PT PELVIC FLOOR PROLAPSE CHECK:64258}  Comments: ***    RECTAL PELVIC FLOOR EXAM    EXTERNAL ASSESSMENT  Anus: {AMB PT PELVIC FLOOR INTROITUS:01755}  Skin condition: {AMB PT PELVIC FLOOR SKIN CONDITION:34907}   Scarring: {AMB PT PELVIC FLOOR SCARRIN}  Sensation: {AMB PT PELVIC FLOOR SENSATION:21514}   Pain: {Blank single:::"none","Q tip test positive","***"}  Voluntary contraction: {AMB PT PELVIC FLOOR CONTRACTION RESPONSE:55293}  Voluntary relaxation: {AMB PT PELVIC FLOOR CONTRACTION RESPONSE:48115}  Involuntary contraction: {AMB PT PELVIC FLOOR CONTRACTION RESPONSE:37197}  Bearing down: {AMB PT PELVIC FLOOR CONTRACTION RESPONSE:52263}  [place]: {Blank " "single:19197::"intact","diminished","difficult to elicit","***"}  Discharge: {Blank single:19197::"none","brown","bloody","***"}       INTERNAL ASSESSMENT  EAS tone: {Blank single:19197::"WNL","hypotonic","hypertonic","***"}   Impaction: {Blank single:19197::"none","stool in vault","***"}   Pain: {AMB PT PELVIC FLOOR INTERAL PAIN:09469}  Sensation: {AMB PT PELVIC FLOOR INTERNAL SENSATION:94186}   Muscle Bulk: {AMB PT PELVIC FLOOR MUSCLE BULK:24567}   Muscle Power: ***/5  Muscle Endurance: *** sec  # Reps To Fatigue: ***    Fast Contractions in 10 seconds: ***     Quality of contraction: {AMB PT PELVIC FLOOR QUALITY OF CONTRACTION:71430}   Specificity: {AMB PT PELVIC FLOOR SPECIFICITY:33898}  Coordination: {AMB PT PELVIC FLOOR COORDINATION:40826}   Comments: ***    Limitation/Restriction for FOTO *** Survey    Therapist reviewed FOTO scores for Laura Erazo on 10/17/2024.   FOTO documents entered into [employer] - see Media section.    Limitation Score: ***%       TREATMENT     Treatment Time In: ***  Treatment Time Out: ***  Total Treatment time (time-based codes) separate from Evaluation: *** minutes      Therapeutic Exercise to develop {AMB PT PROGRESS OBJECTIVE:89167} for *** minutes including:  {Pelvic PT TherEx:75284}    Neuromuscular Re-education to develop {Pelvic Health Neuro Re-Ed:83455} for *** minutes including:   {Pelvic PT NMR:85429}    Manual Therapy to develop {PF manual:61826} for *** minutes including:   {Pelvic Floor Manual Ther:11360}    Therapeutic Activity Patient participated in dynamic functional therapeutic activities to improve functional performance for *** minutes. Including: Education as described below.     Self-Care for *** minutes including:   ***     Patient Education provided:   {Pelvic Health Education:12907} {Actions; was/were:847163} discussed with the pt. Additionally, {Pelvic Floor Edu:59650} {Actions; was/were:157988} reviewed.     Home Exercises provided:  Written Home " "Exercises provided: {Blank single:72824::"yes","Patient instructed to cont prior HEP"}.  Exercises were reviewed and Laura was able to demonstrate them prior to the end of the session.    Laura demonstrated {Desc; good/fair/poor:10297} understanding of the education provided.     See EMR under {Blank single:35695::"Media","Patient Instructions"} for exercises provided {Blank single:24426::"10/17/2024","prior visit"}.    Assessment     Laura is a 19 y.o. female referred to outpatient Physical Therapy with a medical diagnosis of ***. Pt presents with {AMB PT PELVIC FLOOR PROBLEM LIST:80599}. ***     Pt prognosis is {REHAB PROGNOSIS OHS:13835}.   Pt will benefit from skilled outpatient Physical Therapy to address the deficits stated above and in the chart below, provide pt/family education, and to maximize pt's level of independence.     Plan of care discussed with patient: {YES:50503}  Pt's spiritual, cultural and educational needs considered and patient is agreeable to the plan of care and goals as stated below:     Anticipated Barriers for therapy: {PT PELVIC FLOOR EDUCATIONAL/SPIRITUAL/CULTURAL NEEDS:24234}    Medical Necessity is demonstrated by the following:    History  Co-morbidities and personal factors that may impact the plan of care Co-morbidities   {Co-morbidities:38920}    Personal Factors  {Personal Factors:98763}     {Desc; low/moderate/high:291214}   Examination  Body structures and functions, activity limitations and participation restrictions that may impact the plan of care Body Regions/Systems/Functions:  {AMB PT PELVIC FLOOR PROBLEM LIST:36445}     Activity Limitations:  {Pelvic Floor PT Activity Limitations:51014}    Participation Restrictions:  {Pelvic Floor PT Participation Restrictions:16442}    Activity limitations:   Learning and applying knowledge  None    General Tasks and Commands  None    Communication  None    Mobility  None    Self care  None    Domestic " Life  None    Interactions/Relationships  None    Life Areas  None    Community and Social Life  None       {Desc; low/moderate/high:779914}   Clinical Presentation {Clinical Presentation :42300} {Desc; low/moderate/high:074776}   Decision Making/ Complexity Score: {Desc; low/moderate/high:970910}       Goals:  Short Term Goals: 2 weeks  Pt to report increased awareness of PFM lift/drop during exercises and functional activities.  Pt to be I with double voiding techniques.  Pt to be I with diaphragmatic breathing.      Long Term Goals: *** weeks   ***    Plan     Plan of care Certification: 10/17/2024 to ***.    Outpatient Physical Therapy {NUMBERS 1-5:03139} times per *** week(s)  for {NUMBERS 0 - 12 :66775} weeks to include the following interventions: {Plan of Care:70069}    Nayely Penn, PT, BCB-PMD

## 2024-10-17 NOTE — PATIENT INSTRUCTIONS
Hold stretch 2-5 minutes, while diaphragmatically breathing. Place pillows under knees for comfort.

## 2024-10-17 NOTE — PROGRESS NOTES
[facility]  Pelvic Health Physical Therapy Initial Evaluation    Date: 10/17/2024   Name: Laura Erazo  Clinic Number: [id number]  Therapy Diagnosis:   Encounter Diagnosis   Name Primary?    Pelvic floor dysfunction Yes     Physician: Noelle Finney, *    Physician Orders: PT Eval and Treat    Medical Diagnosis from Referral: Pelvic pain [R10.2]   Evaluation Date: 10/17/2024  Authorization Period Expiration: 10/7/2025  Plan of Care Expiration: 1/17/2025  Visit # / Visits authorized: 1/ 1    Time In: 4:10  Time Out: 4:50  Total Appointment Time (timed & untimed codes): 40 minutes    Precautions: universal    Subjective     Date of onset: about a year ago    History of current condition - Laura reports: that she was having pain in her hip and tailbone.  She also thought that she had period cramps.  Also internal hemorrhoids.  These were banded and she had abdominal cramping afterward.  She still has bleeding half the week, despite the banding.    OB/GYN History: G0; irregular periods; BCP helped, but she didn't get a period for a year after stopping.  This is being worked up in Ob/Gyn.  Sexually active? No  Pain with vaginal exams, intercourse or tampon use? Yes; both superficial and deep    Bladder/Bowel History: slow stream, trouble emptying bladder completely, and constipation/straining for movement  Frequency of urination:   Daytime: every 2-3 hours           Nighttime: 0-1  Difficulty initiating urine stream: No  Urine stream: interrupted  Complete emptying: No  Bladder leakage: No  Urinary Urgency: No, Able to delay the urge for at least 30 minute(s).  Frequency of bowel movements: once a day  Difficulty initiating BM: Yes  Quality/Shape of BM: [place] Stool Chart 4-5  Does Patient Feel Empty after BM? No  Fiber Supplements or Laxative Use? No- not now, but was using Dulcolax monthly for a while; now taking stool softeners for her hemorrhoids  Colon leakage:  "No      Pain:  Location:  tailbone   Current: did not quantify on this date    Medical History: Laura  has a past medical history of Reactive airway disease.     Surgical History: Laura Erazo  has no past surgical history on file.    Medications: Laura has a current medication list which includes the following prescription(s): dextroamphetamine-amphetamine, hydrocortisone, levalbuterol, and winlevi.    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  Prior Level of Function: could perform ADL"s without pain and move bowels and empty bladder  Current Level of Function: pain with ADL's    Types of fluid intake: water and Energy drinks  Diet: no restrictions   Habitus: thin  Abuse/Neglect: No     Pts goals: to perform ADL's without pain, and to empty her bladder and bowels more easily    OBJECTIVE     See EMR under MEDIA for written consent provided 10/17/2024  Chaperone: declined    ORTHO SCREEN  Posture in sitting: WNL  Posture in standing: WNL  Pelvic alignment: no sign of deviations noted in supine     ABDOMINAL WALL ASSESSMENT  Palpation: mild tenderness with palpation of the L psoas  Abdominal strength: Rectus abdominus: 4/5     Transverse abdominus: oblique dominant  Scarring: none  Diastasis: absent       BREATHING MECHANICS ASSESSMENT   Thorax Assessment During Quiet Respiration: WNL excursion of abdominal wall  Thorax Assessment During Deep Respiration: WNL excursion of abdominal wall    VAGINAL PELVIC FLOOR EXAM    EXTERNAL ASSESSMENT  Introitus: WNL  Skin condition: WNL  Scarring: none   Sensation: WNL   Pain: none    Voluntary contraction: visible lift  Voluntary relaxation: visible drop  Involuntary " contraction: visible lift  Bearing down: bulge  Perineal descent: absent      INTERNAL ASSESSMENT  Pain: tender areas noted as follows: R LA and L OI   Sensation: able to localized pressure appropriately   Vaginal vault: WNL   Muscle Bulk: hypertonus   Muscle Power: 2+/5  Muscle Endurance: 7 sec      Quality of contraction: slow relaxation   Specificity: patient contracts: adductors   Coordination: tends to hold breath during PFM contraction   Prolapse check: none    RECTAL PELVIC FLOOR EXAM- deferred to next session      Limitation/Restriction for FOTO PFDI Pain Survey    Therapist reviewed FOTO scores for Laura Erazo on 10/17/2024.   FOTO documents entered into [employer] - see Media section.    Limitation Score: 13%       TREATMENT     Treatment Time In: 4:40  Treatment Time Out: 4:50  Total Treatment time (time-based codes) separate from Evaluation: 10 minutes      Therapeutic Exercise to develop flexibility and downtraining for 10 minutes including:  Instruction in diaphragmatic breathing in Frog leg stretch per handout issued.      Patient Education provided:   general anatomy/physiology of urinary/ bowel  system, benefits of treatment, risks of treatment, and alternative methods of treatment were discussed with the pt. Additionally, anatomy/physiology of pelvic floor and posture/body mechanices were reviewed.     Home Exercises provided:  Written Home Exercises provided: yes.  Exercises were reviewed and Laura was able to demonstrate them prior to the end of the session.    Laura demonstrated good  understanding of the education provided.     See EMR under Patient Instructions for exercises provided 10/17/2024.    Assessment     Laura is a 19 y.o. female referred to outpatient Physical Therapy with a medical diagnosis of pelvic pain. Pt presents with poor knowledge of body mechanics and posture, poor trunk stability, pelvic floor tenderness, decreased pelvic muscle strength, increased tension of the  pelvic muscles, poor quality of pelvic muscle contraction, and dysfunctional defecation.       Pt prognosis is Excellent.   Pt will benefit from skilled outpatient Physical Therapy to address the deficits stated above and in the chart below, provide pt/family education, and to maximize pt's level of independence.     Plan of care discussed with patient: Yes  Pt's spiritual, cultural and educational needs considered and patient is agreeable to the plan of care and goals as stated below:     Anticipated Barriers for therapy: none    Medical Necessity is demonstrated by the following:    History  Co-morbidities and personal factors that may impact the plan of care Co-morbidities   History of reactive airway disease    Personal Factors  no deficits     moderate   Examination  Body structures and functions, activity limitations and participation restrictions that may impact the plan of care Body Regions/Systems/Functions:     poor knowledge of body mechanics and posture, poor trunk stability, pelvic floor tenderness, decreased pelvic muscle strength, increased tension of the pelvic muscles, poor quality of pelvic muscle contraction, and dysfunctional defecation    Activity Limitations:  Pain with ADL's    Participation Restrictions:  ADL participation affected by pain and regularly having a comfortable BM    Activity limitations:   Learning and applying knowledge  None    General Tasks and Commands  None    Communication  None    Mobility  None    Self care  None    Domestic Life  None    Interactions/Relationships  None    Life Areas  None    Community and Social Life  None       moderate   Clinical Presentation unstable clinical presentation with unpredictable characteristics high   Decision Making/ Complexity Score: moderate       Goals:  Short Term Goals: 2 weeks  Pt to report increased awareness of PFM lift/drop during exercises and functional activities.  Pt to be I with proper bearing down techniques.  Pt to be I  with diaphragmatic breathing.      Long Term Goals: 12 weeks   Pt will bear down appropriately 80% of the time for more effective stooling and prevent adverse effects to adjacent structures.   Pt will be independent in colon massage for more effective stooling.  Pt will report improved ability to tolerate sitting > or = 30 minutes with < or = 2/10 pain for improved ability to complete ADL's.   Pt will report < or = 2/10 pain with urination/defecation 80% of the time.   Pt/family will be independent with HEP for continued self-management of symptoms.     Plan     Plan of care Certification: 10/17/2024 to 1/17/2025.    Outpatient Physical Therapy 1 times per 2 week(s)  for 12 weeks to include the following interventions: therapeutic exercises, therapeutic activity, neuromuscular re-education, manual therapy, modalities PRN, patient/family education, and self care/home management    Nayely Penn, PT, BCB-PMD

## 2024-10-29 ENCOUNTER — OFFICE VISIT (OUTPATIENT)
Dept: PSYCHIATRY | Facility: CLINIC | Age: 19
End: 2024-10-29
Payer: COMMERCIAL

## 2024-10-29 VITALS
DIASTOLIC BLOOD PRESSURE: 68 MMHG | HEART RATE: 94 BPM | BODY MASS INDEX: 18.1 KG/M2 | SYSTOLIC BLOOD PRESSURE: 124 MMHG | WEIGHT: 89.63 LBS

## 2024-10-29 DIAGNOSIS — F90.9 ATTENTION DEFICIT HYPERACTIVITY DISORDER (ADHD), UNSPECIFIED ADHD TYPE: Primary | ICD-10-CM

## 2024-10-29 PROCEDURE — 99999 PR PBB SHADOW E&M-EST. PATIENT-LVL II: CPT | Mod: PBBFAC,,, | Performed by: STUDENT IN AN ORGANIZED HEALTH CARE EDUCATION/TRAINING PROGRAM

## 2024-10-29 RX ORDER — DEXTROAMPHETAMINE SACCHARATE, AMPHETAMINE ASPARTATE MONOHYDRATE, DEXTROAMPHETAMINE SULFATE AND AMPHETAMINE SULFATE 5; 5; 5; 5 MG/1; MG/1; MG/1; MG/1
20 CAPSULE, EXTENDED RELEASE ORAL DAILY
Qty: 30 CAPSULE | Refills: 0 | Status: SHIPPED | OUTPATIENT
Start: 2024-11-29

## 2024-10-29 RX ORDER — DEXTROAMPHETAMINE SACCHARATE, AMPHETAMINE ASPARTATE MONOHYDRATE, DEXTROAMPHETAMINE SULFATE AND AMPHETAMINE SULFATE 5; 5; 5; 5 MG/1; MG/1; MG/1; MG/1
20 CAPSULE, EXTENDED RELEASE ORAL DAILY
Qty: 30 CAPSULE | Refills: 0 | Status: SHIPPED | OUTPATIENT
Start: 2024-10-29

## 2024-11-20 ENCOUNTER — CLINICAL SUPPORT (OUTPATIENT)
Dept: REHABILITATION | Facility: HOSPITAL | Age: 19
End: 2024-11-20
Payer: COMMERCIAL

## 2024-11-20 ENCOUNTER — PATIENT MESSAGE (OUTPATIENT)
Dept: REHABILITATION | Facility: HOSPITAL | Age: 19
End: 2024-11-20

## 2024-11-20 DIAGNOSIS — M62.89 PELVIC FLOOR DYSFUNCTION: Primary | ICD-10-CM

## 2024-11-20 PROCEDURE — 97140 MANUAL THERAPY 1/> REGIONS: CPT | Mod: PO
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
## 2024-12-04 ENCOUNTER — CLINICAL SUPPORT (OUTPATIENT)
Dept: REHABILITATION | Facility: HOSPITAL | Age: 19
End: 2024-12-04
Payer: COMMERCIAL

## 2024-12-04 DIAGNOSIS — M62.89 PELVIC FLOOR DYSFUNCTION: Primary | ICD-10-CM

## 2024-12-04 PROCEDURE — 97530 THERAPEUTIC ACTIVITIES: CPT | Mod: PO

## 2024-12-04 PROCEDURE — 97140 MANUAL THERAPY 1/> REGIONS: CPT | Mod: PO

## 2024-12-04 NOTE — PROGRESS NOTES
"  Pelvic Health Physical Therapy   Treatment Note     Name: Laura Erazo  Clinic Number: [id number]    Therapy Diagnosis:   Encounter Diagnosis   Name Primary?    Pelvic floor dysfunction Yes     Physician: Noelle Finney, *    Visit Date: 12/4/2024     Physician Orders: PT Eval and Treat    Medical Diagnosis from Referral: Pelvic pain [R10.2]   Evaluation Date: 10/17/2024  Authorization Period Expiration: 12/31/2024  Plan of Care Expiration: 1/17/2025  Visit # / Visits authorized: 3/10  Cancelled Visits: 0  No Show Visits: 0    Time In: 9:50  Time Out: 10:30  Total Billable Time: 40 minutes    Precautions: Standard    Subjective     Pt reports: that she just finished finals and was sitting for long periods of time.  Is using a cutout cushion and her low back is feeling better but she is now having "pelvic pain"- pain in her suprapubic area.     She was compliant with home exercise program.  Response to previous treatment: no adverse effects  Functional change: none noted yet    Pain: 4/10  Location: tailbone, but she notes "pelvic pain"    Objective     Laura received the following manual therapy techniques: to develop desensitization for 30 minutes including: ischemic pressure and TPR to the OI and levator ani B'ly.  Intravaginal work was performed today with pt's verbal consent.  Pt was able to tolerate passive stretching of the vaginal muscles today, with verbal cues calling her attention to muscle guarding in gluts and hamstrings.  Verbal cues for diaphragmatic breathing as well.  Muscles on the R were noted to be tighter today compared with L.  We spoke at the end of the session about using guided imagery meditation (Jody Dennis's celina)to help her do body scans and mindful muscle tension release.      Laura also rec'd 10 minutes of therapeutic activity to improve functional performance as follows:  kinesiotaping for coccydynia per handout issued; instruction in postural adjustments when " sitting to study and when driving to minimize muscle guarding and postural strain in ADL's.      Home Exercises Provided and Patient Education Provided     Education provided:   - anatomy/physiology of pelvic floor and posture/body mechanices  Discussed progression of plan of care with patient; educated pt in activity modification; reviewed HEP with pt. Pt demonstrated and verbalized understanding of all instruction and was provided with a handout of HEP (see Patient Instructions).    Written Home Exercises Provided: yes.  Exercises were reviewed and Laura was able to demonstrate them prior to the end of the session.  Laura demonstrated good  understanding of the education provided.     See EMR under Patient Instructions for exercises provided 12/3/2024    Assessment     Working vaginally will hopefully get us closer to her finding self release via check ins and possibly with wand/dilator.  She wants to lift weights but I encouraged her to explore yoga and cardio- she can lift when she has a better handle on her pelvic muscle hypertonicity.        Laura Is progressing well towards her goals.   Pt prognosis is Good.     Pt will continue to benefit from skilled outpatient physical therapy to address the deficits listed in the problem list box on initial evaluation, provide pt/family education and to maximize pt's level of independence in the home and community environment.     Pt's spiritual, cultural and educational needs considered and pt agreeable to plan of care and goals.     Anticipated barriers to physical therapy: none    Goals: Short Term Goals: 2 weeks  Pt to report increased awareness of PFM lift/drop during exercises and functional activities.  Pt to be I with proper bearing down techniques.  Pt to be I with diaphragmatic breathing.       Long Term Goals: 12 weeks   Pt will bear down appropriately 80% of the time for more effective stooling and prevent adverse effects to adjacent structures.   Pt will  be independent in colon massage for more effective stooling.  Pt will report improved ability to tolerate sitting > or = 30 minutes with < or = 2/10 pain for improved ability to complete ADL's.   Pt will report < or = 2/10 pain with urination/defecation 80% of the time.   Pt/family will be independent with HEP for continued self-management of symptoms.   ALL PROGRESSING    Plan     Plan of care Certification: 10/17/2024 to 1/17/2025.     Outpatient Physical Therapy 1 times per 2 week(s)  for 12 weeks to include the following interventions: therapeutic exercises, therapeutic activity, neuromuscular re-education, manual therapy, modalities PRN, patient/family education, and self care/home management    Nayely Penn, PT, BCB-PMD

## 2024-12-06 DIAGNOSIS — K64.8 INTERNAL BLEEDING HEMORRHOIDS: ICD-10-CM

## 2024-12-06 RX ORDER — HYDROCORTISONE 25 MG/G
CREAM TOPICAL 2 TIMES DAILY
Qty: 28 G | Refills: 2 | Status: SHIPPED | OUTPATIENT
Start: 2024-12-06

## 2024-12-23 NOTE — PROGRESS NOTES
"OBSTETRICS AND GYNECOLOGY    Subjective:      Chief Complaint:  Oligomenorrhea f/u    HPI:  Laura Erazo is an 19 y.o. P0 presenting with concerns of oligomenorrhea. Had menses in Oct, none in November, and again in December! No acute complaints.     OB History   No obstetric history on file.     Past Medical History:   Diagnosis Date    Reactive airway disease     1-2 times /year     History reviewed. No pertinent surgical history.  Family History   Problem Relation Name Age of Onset    Cancer Maternal Grandfather      Hyperlipidemia Maternal Grandfather      Heart disease Paternal Grandfather          smoker with early ds    No Known Problems Mother miguelangel     No Known Problems Father sienna     Heart disease Maternal Grandmother      Thyroid disease Maternal Grandmother         Allergies: Review of patient's allergies indicates:  No Known Allergies    Medications:   Current Outpatient Medications   Medication Sig Dispense Refill    [START ON 2/19/2025] dextroamphetamine-amphetamine (ADDERALL XR) 20 MG 24 hr capsule Take 1 capsule (20 mg total) by mouth once daily. 30 capsule 0    [START ON 1/19/2025] dextroamphetamine-amphetamine (ADDERALL XR) 20 MG 24 hr capsule Take 1 capsule (20 mg total) by mouth once daily. 30 capsule 0    [START ON 3/19/2025] dextroamphetamine-amphetamine (ADDERALL XR) 20 MG 24 hr capsule Take 1 capsule (20 mg total) by mouth once daily. 30 capsule 0    hydrocortisone (ANUSOL-HC) 2.5 % rectal cream Place rectally 2 (two) times daily. 28 g 2    levalbuterol (XOPENEX) 0.63 mg/3 mL nebulizer solution Take 1 ampule by nebulization every 4 (four) hours as needed.      WINLEVI 1 % Crea APPLY TO WHOLE FACE TWICE DAILY.       No current facility-administered medications for this visit.       Social History     Tobacco Use    Smoking status: Never    Smokeless tobacco: Never   Substance Use Topics    Alcohol use: Yes       Objective:   /60   Ht 4' 11" (1.499 m)   Wt 40.8 kg (89 lb " 15.2 oz)   LMP 12/11/2024   BMI 18.17 kg/m²   Physical Exam    GENERAL: Alert, well dressed, well nourished. Appropriate mood and affect. Pleasant.  HEENT: Normocephalic, atraumatic. Extraocular movements intact. Hearing and vision grossly intact.   PULMONARY: No respiratory distress. No use of accessory muscles of respiration. No cyanosis. Speaking comfortably in full sentences.   CARDIAC: Well perfused.    EXTREMITIES: No visible rashes.     Assessment/Plan:     Stopped OCPs last fall. Did not like how it made her feel. Menses took several months to return. No menses until 4/2024. In the past, menses have also been about q7 months.   History of oligomenorrhea.  No pelvic surgeries in the past.   Medication list reviewed.   Denies increased acne/excess hair.  Denies galactorrhea.   Stress level - nursing school.   Endorses healthy appetite.   BMI 18 - weight trend reviewed  - Reviewed labs   - STD screening - declines  - Reviewed TVUS  - Consider serial progesterone challenge vs OCPs  At this time, through shared decision making, will assess if menses this month  If not, patient may want to initiate OCP trial of different OCP than she had in the past  To let me know if no menses by end of January  - Recommend vitamin D, calcium supplement  - Agreeable to STD screening today    As of April 1, 2021, the Cures Act has been passed nationally. This new law requires that all doctors progress notes, lab results, pathology reports and radiology reports be released IMMEDIATELY to the patient in the patient portal. That means that the results are released to you at the EXACT same time they are released to me. Therefore, with all of the patients that I have I am not able to reply to each patient exactly when the results come in. So there will be a delay from when you see the results to when I see them and have time to come up with a response to send you. Also I only see these results when I am on the computer at work. So if  the results come in over the weekend or after 5 pm of a work day, I will not see them until the next business day. As you can tell, this is a challenge as a physician to give every patient the quick response they hope for and deserve. So please be patient!   Thanks for your understanding and patience.

## 2024-12-31 ENCOUNTER — OFFICE VISIT (OUTPATIENT)
Dept: PSYCHIATRY | Facility: CLINIC | Age: 19
End: 2024-12-31
Payer: COMMERCIAL

## 2024-12-31 DIAGNOSIS — F90.9 ATTENTION DEFICIT HYPERACTIVITY DISORDER (ADHD), UNSPECIFIED ADHD TYPE: Primary | ICD-10-CM

## 2024-12-31 RX ORDER — DEXTROAMPHETAMINE SACCHARATE, AMPHETAMINE ASPARTATE MONOHYDRATE, DEXTROAMPHETAMINE SULFATE AND AMPHETAMINE SULFATE 5; 5; 5; 5 MG/1; MG/1; MG/1; MG/1
20 CAPSULE, EXTENDED RELEASE ORAL DAILY
Qty: 30 CAPSULE | Refills: 0 | Status: SHIPPED | OUTPATIENT
Start: 2025-02-19

## 2024-12-31 RX ORDER — DEXTROAMPHETAMINE SACCHARATE, AMPHETAMINE ASPARTATE MONOHYDRATE, DEXTROAMPHETAMINE SULFATE AND AMPHETAMINE SULFATE 5; 5; 5; 5 MG/1; MG/1; MG/1; MG/1
20 CAPSULE, EXTENDED RELEASE ORAL DAILY
Qty: 30 CAPSULE | Refills: 0 | Status: SHIPPED | OUTPATIENT
Start: 2025-01-19

## 2024-12-31 RX ORDER — DEXTROAMPHETAMINE SACCHARATE, AMPHETAMINE ASPARTATE MONOHYDRATE, DEXTROAMPHETAMINE SULFATE AND AMPHETAMINE SULFATE 5; 5; 5; 5 MG/1; MG/1; MG/1; MG/1
20 CAPSULE, EXTENDED RELEASE ORAL DAILY
Qty: 30 CAPSULE | Refills: 0 | Status: SHIPPED | OUTPATIENT
Start: 2025-03-19

## 2024-12-31 NOTE — PROGRESS NOTES
Outpatient Psychiatry Follow-Up Visit (MD/NP)  Clinical Status of Patient:  Outpatient (Ambulatory)    The chief complaint leading to consultation is: follow up of ADHD  Visit type: audiovisual     Face to Face time with patient: 20  30 minutes of total time spent on the encounter, which includes face to face time and non-face to face time preparing to see the patient (eg, review of tests), Obtaining and/or reviewing separately obtained history, Documenting clinical information in the electronic or other health record, Independently interpreting results (not separately reported) and communicating results to the patient/family/caregiver, or Care coordination (not separately reported).      Each patient to whom he or she provides medical services by telemedicine is:  (1) informed of the relationship between the physician and patient and the respective role of any other health care provider with respect to management of the patient; and (2) notified that he or she may decline to receive medical services by telemedicine and may withdraw from such care at any time.     Clinical Status of Patient:  Outpatient (Ambulatory)    Chief Complaint:  Laura Erazo is a 19 y.o. female who presents today for follow-up of  ADHD . Met with patient.        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 She states that she did not have an increase in overall anxiety and felt her ADHD medication was working well. She states her mood has improved since her last visit. She denies suicidal ideation, homicidal ideation, symptoms of keenan or psychosis at this time. She is eating and sleeping well.  Endorses good symptom control with Adderall XR. Denies any  side effects from the medication. Denies any other questions or concerns at this time.       Review of Systems   Medical ROS: See HPI    Past Medical, Family and Social History: The patient's past medical, family and social history have been reviewed and updated as appropriate within the electronic medical record - see encounter notes.    Current Meds: Adderall 20 mg XR  Compliance: yes    Previous Medications/Side effects   - Straterra: caused constipation       Risk Parameters:  Patient reports no suicidal ideation  Patient reports no homicidal ideation  Patient reports no self-injurious behavior  Patient reports no violent behavior    Exam (detailed: at least 9 elements; comprehensive: all 15 elements)   Constitutional  Vitals:  Most recent vital signs, dated less than 90 days prior to this appointment, were reviewed.   There were no vitals filed for this visit.           General:  unremarkable, age appropriate     Musculoskeletal  Muscle Strength/Tone:  not examined   Gait & Station:  not examined     Psychiatric  Speech:  no latency; no press   Mood & Affect:  euthymic  congruent and appropriate   Thought Process:  normal and logical   Associations:  intact   Thought Content:  no suicidality, no homicidality, delusions, or paranoia   Insight:  intact, has awareness of illness   Judgement: behavior is adequate to circumstances   Orientation:  grossly intact   Memory: intact for content of interview   Language: grossly intact   Attention Span & Concentration:  able to focus   Fund of Knowledge:  intact and appropriate to age and level of education     Assessment and Diagnosis   19 y.o. female with ADHD presenting for medication management and follow up after Psychological testing for focus and concentration issues. Patient stable on current regimen.       General Impression:    ICD-10-CM ICD-9-CM   1. Attention deficit hyperactivity disorder (ADHD), unspecified ADHD type  F90.9 314.01          Intervention/Counseling/Treatment Plan   Psych Meds:  Continue Adderall XR 20 mg PO Daily, LAPMP Reviewed, no concerns or abnormalities noted    Discussed below risks of stimulant with patient    Increased risk for heart problems, high blood pressure, and sudden cardiac death.   Decreased appetite. People seem to be less hungry during the middle of the day, but they are often hungry by dinnertime as the medication wears off.   Sleep problems. If a person cannot fall asleep, the doctor may prescribe a lower dose. The doctor might also suggest that the medication is taken earlier in the day, or stop the afternoon or evening dose.    Stomach aches and headaches.   Tics. A few people develop sudden, repetitive movements or sounds called tics. These tics may or may not be noticeable. Changing the medication dosage may make tics go away. Some people also may appear to have a personality change, such as appearing flat or without emotion. Talk with your doctor if you see any of these side effects.  Stimulant medications may lead to drug abuse or dependence, but the risk is highest for those patient taking this class of medication who do not have ADHD. Research shows that teens with ADHD who took stimulant medications were less likely to abuse drugs than those who did not take stimulant medications. Proper diagnosis is the key to minimizing this risk.  Discussed with female patients that they will need to be off of stimulant if trying to become pregnant or become pregnant.    Instructions:  Take all medications as prescribed.    Abstain from recreational drugs and alcohol.  Present to ED or call 911 for SI/HI plan or intent, psychosis, or medical emergency.      Return to Clinic: 3 months    Mac Peters MD  [facility], PGY-III

## 2025-01-02 ENCOUNTER — OFFICE VISIT (OUTPATIENT)
Dept: OBSTETRICS AND GYNECOLOGY | Facility: CLINIC | Age: 20
End: 2025-01-02
Payer: COMMERCIAL

## 2025-01-02 VITALS
SYSTOLIC BLOOD PRESSURE: 104 MMHG | HEIGHT: 59 IN | DIASTOLIC BLOOD PRESSURE: 60 MMHG | WEIGHT: 89.94 LBS | BODY MASS INDEX: 18.13 KG/M2

## 2025-01-02 DIAGNOSIS — Z11.3 SCREEN FOR STD (SEXUALLY TRANSMITTED DISEASE): Primary | ICD-10-CM

## 2025-01-02 PROCEDURE — 99999 PR PBB SHADOW E&M-EST. PATIENT-LVL III: CPT | Mod: PBBFAC,,, | Performed by: STUDENT IN AN ORGANIZED HEALTH CARE EDUCATION/TRAINING PROGRAM

## 2025-01-02 PROCEDURE — 3008F BODY MASS INDEX DOCD: CPT | Mod: CPTII,S$GLB,, | Performed by: STUDENT IN AN ORGANIZED HEALTH CARE EDUCATION/TRAINING PROGRAM

## 2025-01-02 PROCEDURE — 1159F MED LIST DOCD IN RCRD: CPT | Mod: CPTII,S$GLB,, | Performed by: STUDENT IN AN ORGANIZED HEALTH CARE EDUCATION/TRAINING PROGRAM

## 2025-01-02 PROCEDURE — 99212 OFFICE O/P EST SF 10 MIN: CPT | Mod: S$GLB,,, | Performed by: STUDENT IN AN ORGANIZED HEALTH CARE EDUCATION/TRAINING PROGRAM

## 2025-01-02 PROCEDURE — 87591 N.GONORRHOEAE DNA AMP PROB: CPT | Performed by: STUDENT IN AN ORGANIZED HEALTH CARE EDUCATION/TRAINING PROGRAM

## 2025-01-02 PROCEDURE — 3074F SYST BP LT 130 MM HG: CPT | Mod: CPTII,S$GLB,, | Performed by: STUDENT IN AN ORGANIZED HEALTH CARE EDUCATION/TRAINING PROGRAM

## 2025-01-02 PROCEDURE — 3078F DIAST BP <80 MM HG: CPT | Mod: CPTII,S$GLB,, | Performed by: STUDENT IN AN ORGANIZED HEALTH CARE EDUCATION/TRAINING PROGRAM

## 2025-01-04 LAB
C TRACH DNA SPEC QL NAA+PROBE: NOT DETECTED
N GONORRHOEA DNA SPEC QL NAA+PROBE: NOT DETECTED

## 2025-01-06 ENCOUNTER — CLINICAL SUPPORT (OUTPATIENT)
Dept: REHABILITATION | Facility: HOSPITAL | Age: 20
End: 2025-01-06
Payer: COMMERCIAL

## 2025-01-06 DIAGNOSIS — M62.89 PELVIC FLOOR DYSFUNCTION: Primary | ICD-10-CM

## 2025-01-06 PROCEDURE — 97140 MANUAL THERAPY 1/> REGIONS: CPT | Mod: PO

## 2025-01-06 NOTE — PROGRESS NOTES
Pelvic Health Physical Therapy   Treatment Note     Name: Laura Erazo  Clinic Number: [id number]    Therapy Diagnosis:   Encounter Diagnosis   Name Primary?    Pelvic floor dysfunction Yes     Physician: Noelle Finney, *    Visit Date: 1/6/2025     Physician Orders: PT Eval and Treat    Medical Diagnosis from Referral: Pelvic pain [R10.2]   Evaluation Date: 10/17/2024  Authorization Period Expiration: 12/31/2024  Plan of Care Expiration: 1/17/2025  Visit # / Visits authorized: 4/10  Cancelled Visits: 0  No Show Visits: 0    Time In: 9:00  Time Out: 9:45  Total Billable Time:45 minutes    Precautions: Standard    Subjective     Pt reports: that she wore the tape for about 3 days.  Has not been able to replicate it but feels that it worked well.  Got a yoga mat for Dilip and has been stretching.  Finding it easier to pee.     She was compliant with home exercise program.  Response to previous treatment: no adverse effects  Functional change: none noted yet    Pain: 2/10  Location: low back and tailbone.      Objective     Laura received the following manual therapy techniques: to develop desensitization for 40 minutes including: ischemic pressure and TPR to the OI and levator ani B'ly, prone sacral PA mobs in 4 directions.  Intravaginal work was performed today with pt's verbal consent.  Less glut guarding was noted today, but pubococcyeus twitches were noted again today, although to a lesser extent.  Verbal cues for diaphragmatic breathing as well.  Laura also rec'd kinesiotaping for coccydynia per handout issued; instruction in postural adjustments when sitting to study and when driving to minimize muscle guarding and postural strain in ADL's.      Home Exercises Provided and Patient Education Provided     Education provided:   - anatomy/physiology of pelvic floor and posture/body mechanices  Discussed progression of plan of care with patient; educated pt in activity modification; reviewed HEP  with pt. Pt demonstrated and verbalized understanding of all instruction and was provided with a handout of HEP (see Patient Instructions).    Written Home Exercises Provided: yes.  Exercises were reviewed and Laura was able to demonstrate them prior to the end of the session.  Lauar demonstrated good  understanding of the education provided.     See EMR under Patient Instructions for exercises provided 1/6/2025    Assessment     Will have her bring in wand next session for instruction.  She is antsy to start strength training- need to get a handle on her muscle tension once school starts- then we can visit strength training.       Laura Is progressing well towards her goals.   Pt prognosis is Good.     Pt will continue to benefit from skilled outpatient physical therapy to address the deficits listed in the problem list box on initial evaluation, provide pt/family education and to maximize pt's level of independence in the home and community environment.     Pt's spiritual, cultural and educational needs considered and pt agreeable to plan of care and goals.     Anticipated barriers to physical therapy: none    Goals: Short Term Goals: 2 weeks  Pt to report increased awareness of PFM lift/drop during exercises and functional activities.  Pt to be I with proper bearing down techniques.  Pt to be I with diaphragmatic breathing.       Long Term Goals: 12 weeks   Pt will bear down appropriately 80% of the time for more effective stooling and prevent adverse effects to adjacent structures.   Pt will be independent in colon massage for more effective stooling.  Pt will report improved ability to tolerate sitting > or = 30 minutes with < or = 2/10 pain for improved ability to complete ADL's.   Pt will report < or = 2/10 pain with urination/defecation 80% of the time.   Pt/family will be independent with HEP for continued self-management of symptoms.   ALL PROGRESSING    Plan     Plan of care Certification: 10/17/2024  to 1/17/2025.     Outpatient Physical Therapy 1 times per 2 week(s)  for 12 weeks to include the following interventions: therapeutic exercises, therapeutic activity, neuromuscular re-education, manual therapy, modalities PRN, patient/family education, and self care/home management    Nayely Penn, PT, BCB-PMD

## 2025-01-27 ENCOUNTER — PATIENT MESSAGE (OUTPATIENT)
Dept: OBSTETRICS AND GYNECOLOGY | Facility: CLINIC | Age: 20
End: 2025-01-27
Payer: COMMERCIAL

## 2025-02-05 ENCOUNTER — CLINICAL SUPPORT (OUTPATIENT)
Dept: REHABILITATION | Facility: HOSPITAL | Age: 20
End: 2025-02-05
Payer: COMMERCIAL

## 2025-02-05 DIAGNOSIS — M62.89 PELVIC FLOOR DYSFUNCTION: Primary | ICD-10-CM

## 2025-02-05 PROCEDURE — 97110 THERAPEUTIC EXERCISES: CPT | Mod: PO

## 2025-02-05 PROCEDURE — 97140 MANUAL THERAPY 1/> REGIONS: CPT | Mod: PO

## 2025-02-05 NOTE — PROGRESS NOTES
Outpatient Rehab    Physical Therapy Progress Note : Updated Plan of Care    Patient Name: Laura Erazo  MRN: [id number]  YOB: 2005  Today's Date: 2/5/2025    Therapy Diagnosis:   Encounter Diagnosis   Name Primary?    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                 Objective            Intake Outcome Measure for FOTO Survey    Therapist reviewed FOTO scores for Laura Erazo on 2/5/2025.   FOTO report - see Media section or FOTO account episode details.     Intake Score: 13%  Survey Score 1: 8%  Survey Score 2:  %    Treatment:  Therapeutic Exercise  Therapeutic Exercise Activity 1: review of strengthening activities at the gym like squats, plies, dead lift, and leg press- instructed to avoid excessive weight and to add one activity at a time- little/no weight, high reps.    Manual Therapy  Manual Therapy Activity 1: ischemic pressure to B levators and OI intravaginally (internal work performed today with pt's verbal consent)  Manual Therapy Activity 2: instruction in self release with Intimate Thelma wand (care and cleaning of wand, along with safe use and precautions were reviewed per handout)    Patient's spiritual, cultural, and educational needs considered and patient agreeable to plan of care and goals.     Assessment & Plan    pt performed well and was able to keep muscles lengthened and dropped more  easily; no tailbone pain noted on internal exam. Will have her work on her muscles at home as reassess readiness for advanced weight training.     Plan of care Certification: 2/5/2025 to 5/5/2025.     Outpatient Physical Therapy 1 times per 2 week(s)  for 12 weeks to include the following interventions: therapeutic exercises, therapeutic activity, neuromuscular re-education, manual therapy, modalities PRN, patient/family education, and self care/home management    Goals: Short Term Goals: 2 weeks  Pt to report increased awareness of PFM lift/drop during exercises and functional activities.  Pt to be I with proper bearing down techniques.  Pt to be I with diaphragmatic breathing.       Long Term Goals: 12 weeks   Pt will bear down appropriately 80% of the time for more effective stooling and prevent adverse effects to adjacent structures.   Pt will be independent in colon massage for more effective stooling.  Pt will report improved ability to tolerate sitting > or = 30 minutes with < or = 2/10 pain for improved ability to complete ADL's.   Pt will report < or = 2/10 pain with urination/defecation 80% of the time.   Pt/family will be independent with HEP for continued self-management of symptoms.   ALL PROGRESSING    Nayely Penn, PT, BCB-PMD

## 2025-02-05 NOTE — PATIENT INSTRUCTIONS
Home Exercise Program: 02/05/2025    WAND TRAINING  1. Make sure the wand is clean, free of any visible soiling.   2. Apply water-based lubricant to the wand and the vaginal opening.   3. Either sit on the toilet and lean back on the tank OR lay back with your back well supported by several pillows and both knees bent.   4. Spread labia and insert the larger end of the wand.  5. Apply downward pressure onto the levator ani group of muscles on one side.     To find the levator ani muscles:   - In the middle at 6 o'clock = Rectum   - Move a little to the right at 5 o'clock = Right levator ani group   - Move a little to the left at 7 o'clock = Left levator ani group   - A little more to the right at 3/4 o'clock = Right obturator internus muscle   - A little more to the left at 8/9 o'clock = Left obturator internus muscle   - If you feel sharp, stabbing pain = bone or pudendal nerve (wrong)  6. Begin Diaphragmatic Breathing and drop your pelvic floor muscle (releasing the tension), following the encouragement of the wand.  You can use Contract/Relax technique by doing one contraction, then releasing and applying pressure with the wand.  Maintain your pressure after contraction for 30-90 seconds, doing belly breaths.    7. Repeat on the levator ani group of muscles on the other side.  8. Continue for 10-15 minutes.  9.You can also move the wand slightly side/side or forward/backward to get the tissues used to being mobilized.   10. Continue for 5 minutes.   14. Once finished, remove wand.  15. Wash with anti-bacterial soap and thoroughly rinse. Let air dry whenever possible. Store in a dry, clean location.   16. Repeat every other day, reducing frequency as your symptoms improve.      *Don't apply so much pressure that it leaves you too sore to perform again the next day.    STOP if there is increased bleeding, an infection, or extreme pain.      WORK ON SQUATS AND/OR PLIES with no weight; also DEAD LIFTS in sets of 10-  work on form and squeeze gluts and quads on the way up.

## 2025-02-26 DIAGNOSIS — K64.8 INTERNAL BLEEDING HEMORRHOIDS: ICD-10-CM

## 2025-02-27 RX ORDER — HYDROCORTISONE 25 MG/G
CREAM TOPICAL 2 TIMES DAILY
Qty: 30 G | Refills: 2 | Status: SHIPPED | OUTPATIENT
Start: 2025-02-27

## 2025-03-19 ENCOUNTER — CLINICAL SUPPORT (OUTPATIENT)
Dept: REHABILITATION | Facility: HOSPITAL | Age: 20
End: 2025-03-19
Payer: COMMERCIAL

## 2025-03-19 DIAGNOSIS — M62.89 PELVIC FLOOR DYSFUNCTION: Primary | ICD-10-CM

## 2025-03-19 PROCEDURE — 97110 THERAPEUTIC EXERCISES: CPT | Mod: PO

## 2025-03-19 NOTE — PATIENT INSTRUCTIONS
Single Leg bridge- 10 reps on each side- slow and purposeful!      Straight leg raise with abdominal set: 10 reps on each side- be sure to draw in your lower belly first, and hold throughout.

## 2025-03-19 NOTE — PROGRESS NOTES
Outpatient Rehab    Physical Therapy Visit    Patient Name: Laura Erazo  MRN: [id number]  YOB: 2005  Encounter Date: 3/19/2025    Therapy Diagnosis:   Encounter Diagnosis   Name Primary?    Pelvic floor dysfunction Yes     Physician: Noelle Finney, *    Physician Orders: Eval and Treat  Medical Diagnosis: Pelvic pain    Visit # / Visits Authorized:  3 / 20  Date of Evaluation: 10/17/2024  Insurance Authorization Period: 1/1/2025 to 12/31/2025  Plan of Care Certification:  2/5/2025 to 5/5/2025        Time In: 1110   Time Out: 1155  Total Time: 45   Total Billable Time: 45 minutes    Precautions     universal      Subjective   reports that she is still having some discomfort with prolonged sitting, but is much better.  Has been doing TM walking on incline and doing some You tube videos.She uses her wand if she feels that her muscles are getting tight..  Pain reported as 0/10.      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  will continue to work on self mgmt of symptoms and core and pelvic girdle strengthening.  Evaluation/Treatment Tolerance: Patient tolerated treatment well    Patient will continue to benefit from skilled outpatient physical therapy to address the deficits listed in the problem list box on initial evaluation, provide pt/family education and to maximize pt's level of independence in the home and community environment.     Patient's spiritual, cultural, and educational needs considered and  patient agreeable to plan of care and goals.     Education  Education was done with Patient. The patient's learning style includes Demonstration, Listening, Reading, and Tactile. The patient Demonstrates understanding and Verbalizes understanding.         Exercise progression and HEP       Plan: Plan of care Certification: 2/5/2025 to 5/5/2025.     Outpatient Physical Therapy 1 times per 2 week(s)  for 12 weeks to include the following interventions: therapeutic exercises, therapeutic activity, neuromuscular re-education, manual therapy, modalities PRN, patient/family education, and self care/home management    Goals: Short Term Goals: 2 weeks  Pt to report increased awareness of PFM lift/drop during exercises and functional activities.  Pt to be I with proper bearing down techniques.  Pt to be I with diaphragmatic breathing.       Long Term Goals: 12 weeks   Pt will bear down appropriately 80% of the time for more effective stooling and prevent adverse effects to adjacent structures.   Pt will be independent in colon massage for more effective stooling.  Pt will report improved ability to tolerate sitting > or = 30 minutes with < or = 2/10 pain for improved ability to complete ADL's.   Pt will report < or = 2/10 pain with urination/defecation 80% of the time.   Pt/family will be independent with HEP for continued self-management of symptoms.   ALL PROGRESSING    Nayely Penn, PT, BCB-PMD

## 2025-03-25 ENCOUNTER — OFFICE VISIT (OUTPATIENT)
Dept: PSYCHIATRY | Facility: CLINIC | Age: 20
End: 2025-03-25
Payer: COMMERCIAL

## 2025-03-25 DIAGNOSIS — F90.9 ATTENTION DEFICIT HYPERACTIVITY DISORDER (ADHD), UNSPECIFIED ADHD TYPE: Primary | ICD-10-CM

## 2025-03-25 NOTE — PROGRESS NOTES
Outpatient Psychiatry Follow-Up Visit (MD/NP)  Clinical Status of Patient:  Outpatient (Ambulatory)  3/25/25      The patient location is: [place]  The chief complaint leading to consultation is: Follow-Up Psychiatric Assessment  Visit type: audiovisual    30 minutes of total time spent on the encounter, which includes face to face time and non-face to face time preparing to see the patient (eg, review of tests), Obtaining and/or reviewing separately obtained history, Documenting clinical information in the electronic or other health record, Independently interpreting results (not separately reported) and communicating results to the patient/family/caregiver, or Care coordination (not separately reported).      Each patient to whom he or she provides medical services by telemedicine is:  (1) informed of the relationship between the physician and patient and the respective role of any other health care provider with respect to management of the patient; and (2) notified that he or she may decline to receive medical services by telemedicine and may withdraw from such care at any time.     Clinical Status of Patient:  Outpatient (Ambulatory)    Chief Complaint:  Laura Erazo is a 19 y.o. female who presents today for follow-up of  ADHD . Met with patient.      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 She states that she feels her anxiety is appropriate to her situations (NCLEX, starting new job) and is controlled. She states her mood has improved since her last visit. She denies suicidal ideation, homicidal ideation, symptoms of keenan or psychosis at this time. She is eating and sleeping well.  Endorses good symptom control with Adderall XR. Denies any side effects from the medications. Denies any other questions or concerns at this time.       Review of Systems   Medical  ROS: See HPI    Past Medical, Family and Social History: The patient's past medical, family and social history have been reviewed and updated as appropriate within the electronic medical record - see encounter notes.    Current Meds: Adderall 20 mg XR   Compliance: yes    Previous Medications/Side effects   - Straterra: caused constipation       Risk Parameters:  Patient reports no suicidal ideation  Patient reports no homicidal ideation  Patient reports no self-injurious behavior  Patient reports no violent behavior    Exam (detailed: at least 9 elements; comprehensive: all 15 elements)   Constitutional  Vitals:  Most recent vital signs, dated less than 90 days prior to this appointment, were reviewed.   There were no vitals filed for this visit.           General:  unremarkable, age appropriate     Musculoskeletal  Muscle Strength/Tone:  not examined   Gait & Station:  not examined     Psychiatric  Speech:  no latency; no press   Mood & Affect:  euthymic  congruent and appropriate   Thought Process:  normal and logical   Associations:  intact   Thought Content:  no suicidality, no homicidality, delusions, or paranoia   Insight:  intact, has awareness of illness   Judgement: behavior is adequate to circumstances   Orientation:  grossly intact   Memory: intact for content of interview   Language: grossly intact   Attention Span & Concentration:  able to focus   Fund of Knowledge:  intact and appropriate to age and level of education     Assessment and Diagnosis   19 y.o. female with ADHD presenting for medication management and follow up after Psychological testing for focus and concentration issues. Patient stable on current regimen.       General Impression:    ICD-10-CM ICD-9-CM   1. Attention deficit hyperactivity disorder (ADHD), unspecified ADHD type  F90.9 314.01           Intervention/Counseling/Treatment Plan   Psych Meds:  Continue Adderall XR 20 mg PO Daily, MEGHAN Reviewed, (filled #30 2/26/25,  1/20/25)    Discussed below risks of stimulant with patient    Increased risk for heart problems, high blood pressure, and sudden cardiac death.   Decreased appetite. People seem to be less hungry during the middle of the day, but they are often hungry by dinnertime as the medication wears off.   Sleep problems. If a person cannot fall asleep, the doctor may prescribe a lower dose. The doctor might also suggest that the medication is taken earlier in the day, or stop the afternoon or evening dose.    Stomach aches and headaches.   Tics. A few people develop sudden, repetitive movements or sounds called tics. These tics may or may not be noticeable. Changing the medication dosage may make tics go away. Some people also may appear to have a personality change, such as appearing flat or without emotion. Talk with your doctor if you see any of these side effects.  Stimulant medications may lead to drug abuse or dependence, but the risk is highest for those patient taking this class of medication who do not have ADHD. Research shows that teens with ADHD who took stimulant medications were less likely to abuse drugs than those who did not take stimulant medications. Proper diagnosis is the key to minimizing this risk.  Discussed with female patients that they will need to be off of stimulant if trying to become pregnant or become pregnant.    Instructions:  Take all medications as prescribed.    Abstain from recreational drugs and alcohol.  Present to ED or call 911 for SI/HI plan or intent, psychosis, or medical emergency.      Return to Clinic: 3 months    Mac Peters MD  [facility], PGY-III

## 2025-03-26 RX ORDER — DEXTROAMPHETAMINE SACCHARATE, AMPHETAMINE ASPARTATE MONOHYDRATE, DEXTROAMPHETAMINE SULFATE AND AMPHETAMINE SULFATE 5; 5; 5; 5 MG/1; MG/1; MG/1; MG/1
20 CAPSULE, EXTENDED RELEASE ORAL DAILY
Qty: 30 CAPSULE | Refills: 0 | Status: SHIPPED | OUTPATIENT
Start: 2025-05-25

## 2025-03-26 RX ORDER — DEXTROAMPHETAMINE SACCHARATE, AMPHETAMINE ASPARTATE MONOHYDRATE, DEXTROAMPHETAMINE SULFATE AND AMPHETAMINE SULFATE 5; 5; 5; 5 MG/1; MG/1; MG/1; MG/1
20 CAPSULE, EXTENDED RELEASE ORAL DAILY
Qty: 30 CAPSULE | Refills: 0 | Status: SHIPPED | OUTPATIENT
Start: 2025-04-25

## 2025-03-26 RX ORDER — DEXTROAMPHETAMINE SACCHARATE, AMPHETAMINE ASPARTATE MONOHYDRATE, DEXTROAMPHETAMINE SULFATE AND AMPHETAMINE SULFATE 5; 5; 5; 5 MG/1; MG/1; MG/1; MG/1
20 CAPSULE, EXTENDED RELEASE ORAL DAILY
Qty: 30 CAPSULE | Refills: 0 | Status: SHIPPED | OUTPATIENT
Start: 2025-03-26

## 2025-04-03 ENCOUNTER — CLINICAL SUPPORT (OUTPATIENT)
Dept: REHABILITATION | Facility: HOSPITAL | Age: 20
End: 2025-04-03
Payer: COMMERCIAL

## 2025-04-03 DIAGNOSIS — M62.89 PELVIC FLOOR DYSFUNCTION: Primary | ICD-10-CM

## 2025-04-03 PROCEDURE — 97112 NEUROMUSCULAR REEDUCATION: CPT | Mod: PO

## 2025-04-03 PROCEDURE — 97530 THERAPEUTIC ACTIVITIES: CPT | Mod: PO

## 2025-04-03 NOTE — PATIENT INSTRUCTIONS
Home Exercise Program: 04/03/2025    Bowel Massage for Constipation and Bloating            Positioning: Lie on your back with legs in a comfortable position. Can also performed in a reclined position.     Ascending/Descending Colon: Start at lower right pelvis. Hands should be just inside pelvic bones. Work your way up towards your right ribs. Then continue the massage across your belly towards your left ribs and then work your way down towards your left pubic bone. Use your fingertips, knuckles, or the heel of your hand to make small, kneading, clockwise-circles.     Ileocecal Valve: Start at the lower right pelvis. Use 2-3 fingers with flat hands and apply downward pressure for 4 seconds, then slowly release pressure for 4 seconds. Repeat 15-20 time    This should NOT cause any pain.      Perform for 5 minutes 1-2x/day or as needed for symptom management.   (After meals works well)        Child's Pose    While in a crawl position, slowly lower your buttocks towards your feet until a stretch is felt along your back and or buttocks. Belly breathe here for 60 sec.    Cat/cow    Position yourself on your hands and knees with your hands placed under your shoulders and your knees directly under your hips. Slowly round your back up towards the ceiling and then arch your back down by pulling your abdomen towards the floor.      Happy Baby     Lying on your back, start off with legs up against wall. Bring one leg towards you with knee bent at 90 degrees and hold the inner foot. Repeat with other leg. Hold for 10 deep breaths. Can also hold behind your knees and let legs flop.

## 2025-04-03 NOTE — PROGRESS NOTES
Outpatient Rehab    Physical Therapy Progress Note/Discharge    Patient Name: Laura Erazo  MRN: [id number]  YOB: 2005  Encounter Date: 4/3/2025    Therapy Diagnosis:   Encounter Diagnosis   Name Primary?    Pelvic floor dysfunction Yes     Physician: Noelle Finney, *    Physician Orders: Eval and Treat  Medical Diagnosis: Pelvic pain    Visit # / Visits Authorized:  4 / 20  Insurance Authorization Period: 1/1/2025 to 12/31/2025  Date of Evaluation: 10/17/2024  Plan of Care Certification: 2/5/2025 to 5/5/2025     Time In: 0930   Time Out: 1010  Total Time: 40   Total Billable Time: 40       Subjective   pt reports that her bowels have been regular- sometimes has to work harder to stool than others.  Finals are in 2 weeks- some stress with the end of the semester..  Pain reported as 2/10. pt reports that her pain has been at a consistent 2/10 level lately    Objective           Treatment:  Balance/Neuromuscular Re-Education  NMR 1: Happy baby with diaphragmatic breath  NMR 2: Child's pose with breath  NMR 3: Cat/cow  Therapeutic Activity  TA 1: instruction in colon massage and ICV induction for improved colon motility  TA 2: review of her symptom management toolbox    Time Entry(in minutes):  Neuromuscular Re-Education Time Entry: 15  Therapeutic Activity Time Entry: 25    Assessment & Plan   Assessment: pt now has a full treatment toolbox, and is able to sit for 60 minutes wiht less pain; actively working in the gym.  No further need for pelvic PT at this time.  Evaluation/Treatment Tolerance: Patient tolerated treatment well    Patient will continue to benefit from skilled outpatient physical therapy to address the deficits listed in the problem list box on initial evaluation, provide pt/family education and to maximize pt's level of independence in the home and community environment.     Patient's spiritual, cultural, and educational needs considered and patient agreeable to plan of  care and goals.     Education  Education was done with Patient. The patient's learning style includes Demonstration, Listening, Reading, and Tactile. The patient Demonstrates understanding and Verbalizes understanding.         Importance of symptoms management via managing stool caliber, stress, posture/body mechanics.  HEP       Plan: d/c PT    Goals:  Short Term Goals: 2 weeks  Pt to report increased awareness of PFM lift/drop during exercises and functional activities.  Pt to be I with proper bearing down techniques.  Pt to be I with diaphragmatic breathing.       Long Term Goals: 12 weeks   Pt will bear down appropriately 80% of the time for more effective stooling and prevent adverse effects to adjacent structures.   Pt will be independent in colon massage for more effective stooling.  Pt will report improved ability to tolerate sitting > or = 30 minutes with < or = 2/10 pain for improved ability to complete ADL's.   Pt will report < or = 2/10 pain with urination/defecation 80% of the time.   Pt/family will be independent with HEP for continued self-management of symptoms.   ALL GOALS MET    Nayely Penn, PT, BCB-PMD

## 2025-04-24 ENCOUNTER — ON-DEMAND VIRTUAL (OUTPATIENT)
Dept: URGENT CARE | Facility: CLINIC | Age: 20
End: 2025-04-24
Payer: COMMERCIAL

## 2025-04-24 DIAGNOSIS — B96.89 LOCALIZED BACTERIAL SKIN INFECTION: Primary | ICD-10-CM

## 2025-04-24 DIAGNOSIS — L08.9 LOCALIZED BACTERIAL SKIN INFECTION: Primary | ICD-10-CM

## 2025-04-24 RX ORDER — MUPIROCIN 20 MG/G
OINTMENT TOPICAL
Qty: 22 G | Refills: 1 | Status: SHIPPED | OUTPATIENT
Start: 2025-04-24

## 2025-04-24 RX ORDER — DOXYCYCLINE 100 MG/1
100 CAPSULE ORAL 2 TIMES DAILY
Qty: 14 CAPSULE | Refills: 0 | Status: SHIPPED | OUTPATIENT
Start: 2025-04-24 | End: 2025-05-01

## 2025-04-24 NOTE — PROGRESS NOTES
Subjective:      Patient ID: Laura Erazo is a 19 y.o. female.    Vitals:  vitals were not taken for this visit.     Chief Complaint: skin infection      Visit Type: TELE AUDIOVISUAL    Patient Location: [place], [place]     Present with the patient at the time of consultation: TELEMED PRESENT WITH PATIENT: None    Past Medical History:   Diagnosis Date    Reactive airway disease     1-2 times /year     History reviewed. No pertinent surgical history.  Review of patient's allergies indicates:  No Known Allergies  Medications Ordered Prior to Encounter[1]  Family History   Problem Relation Name Age of Onset    Cancer Maternal Grandfather      Hyperlipidemia Maternal Grandfather      Heart disease Paternal Grandfather          smoker with early ds    No Known Problems Mother miguelangel     No Known Problems Father sienna     Heart disease Maternal Grandmother      Thyroid disease Maternal Grandmother         Medications Ordered                [facility] [address], [place] [address]    Telephone: [phone]   Fax: [phone]   Hours: Mon-Fri, 8a-5:30p                         Internal Pharmacy (2 of 2)              doxycycline (VIBRAMYCIN) 100 MG Cap    Sig: Take 1 capsule (100 mg total) by mouth 2 (two) times daily. for 7 days       Start: 4/24/25     Quantity: 14 capsule Refills: 0                         mupirocin (BACTROBAN) 2 % ointment    Sig: Apply to affected area 3 times daily       Start: 4/24/25     Quantity: 22 g Refills: 1                           Ohs Peq Odvv Intake    4/24/2025 11:08 AM CDT - Filed by Patient   What is your current physical address in the event of a medical emergency? [address]   Are you able to take your vital signs? No   Please attach any relevant images or files    Is your employer contracted with [facility] [employer]? No         Pt presents with c/o infected ear piercing to the left ear cartilage with noted redness and  crusting and swelling x 2 days.   Denies fever, CP, SOB, dizziness, ha.         Constitution: Negative for fever.   HENT:  Positive for ear pain. Negative for ear discharge.    Respiratory:  Negative for shortness of breath.    Skin:  Positive for erythema.        To ear piercing    Neurological:  Negative for dizziness and headaches.        Objective:   The physical exam was conducted virtually.  Physical Exam   Constitutional: She is oriented to person, place, and time. No distress.   HENT:   Head: Normocephalic.   Ears:   Right Ear: External ear normal.   Left Ear: External ear normal.   Nose: No rhinorrhea or congestion.   Eyes: Conjunctivae are normal.   Neck: Neck supple.   Pulmonary/Chest: Effort normal. No respiratory distress.   Neurological: She is alert and oriented to person, place, and time.   Skin: erythema         Comments: To left ear piercing at cartilage on auricle       Assessment:     1. Localized bacterial skin infection        Plan:   Wash area with warm soap and water   Apply ointment as directed  Take antibiotics as directed  Monitor for increased drainage, pain, skin changes,or worsening symptoms  If new or worsening symptoms follow up in the local Urgent Care or ER      Localized bacterial skin infection  -     doxycycline (VIBRAMYCIN) 100 MG Cap; Take 1 capsule (100 mg total) by mouth 2 (two) times daily. for 7 days  Dispense: 14 capsule; Refill: 0  -     mupirocin (BACTROBAN) 2 % ointment; Apply to affected area 3 times daily  Dispense: 22 g; Refill: 1        We appreciate you trusting us with your medical care. We hope you feel better soon. We will be happy to take care of you for all of your future medical needs.     You must understand that you've received Virtual treatment only and that you may be released before all your medical problems are known or treated. You, the patient, will arrange for follow up care as instructed.     Follow up with your PCP or specialty clinic as directed  in the next 1-2 weeks if not improved or as needed. You can call [phone] to schedule an appointment with the appropriate provider.     If your condition worsens we recommend that you receive another evaluation in person, with your primary care provider, urgent care or at the emergency room immediately or contact your primary medical clinics after hours call service to discuss your concerns.                 [1]   Current Outpatient Medications on File Prior to Visit   Medication Sig Dispense Refill    [START ON 4/25/2025] dextroamphetamine-amphetamine (ADDERALL XR) 20 MG 24 hr capsule Take 1 capsule (20 mg total) by mouth once daily. 30 capsule 0    dextroamphetamine-amphetamine (ADDERALL XR) 20 MG 24 hr capsule Take 1 capsule (20 mg total) by mouth once daily. 30 capsule 0    [START ON 5/25/2025] dextroamphetamine-amphetamine (ADDERALL XR) 20 MG 24 hr capsule Take 1 capsule (20 mg total) by mouth once daily. 30 capsule 0    levalbuterol (XOPENEX) 0.63 mg/3 mL nebulizer solution Take 1 ampule by nebulization every 4 (four) hours as needed.      WINLEVI 1 % Crea APPLY TO WHOLE FACE TWICE DAILY.       No current facility-administered medications on file prior to visit.

## 2025-04-24 NOTE — PATIENT INSTRUCTIONS

## 2025-06-03 ENCOUNTER — OFFICE VISIT (OUTPATIENT)
Dept: PSYCHIATRY | Facility: CLINIC | Age: 20
End: 2025-06-03
Payer: COMMERCIAL

## 2025-06-03 DIAGNOSIS — F90.9 ATTENTION DEFICIT HYPERACTIVITY DISORDER (ADHD), UNSPECIFIED ADHD TYPE: Primary | ICD-10-CM

## 2025-06-03 NOTE — PROGRESS NOTES
Outpatient Psychiatry Follow-Up Visit (MD/NP)  Clinical Status of Patient:  Outpatient (Ambulatory)  06/03/2025    The patient location is: [place]  The chief complaint leading to consultation is: Follow-Up Psychiatric Assessment  Visit type: audiovisual    30 minutes of total time spent on the encounter, which includes face to face time and non-face to face time preparing to see the patient (eg, review of tests), Obtaining and/or reviewing separately obtained history, Documenting clinical information in the electronic or other health record, Independently interpreting results (not separately reported) and communicating results to the patient/family/caregiver, or Care coordination (not separately reported).      Each patient to whom he or she provides medical services by telemedicine is:  (1) informed of the relationship between the physician and patient and the respective role of any other health care provider with respect to management of the patient; and (2) notified that he or she may decline to receive medical services by telemedicine and may withdraw from such care at any time.     Clinical Status of Patient:  Outpatient (Ambulatory)    Chief Complaint:  Laura Erazo is a 19 y.o. female who presents today for follow-up of ADHD. Met with patient.      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 She states that she feels her anxiety is appropriate to her situations (NCLEX, starting new job) and is controlled. She states her mood has improved since her last visit. She denies suicidal ideation, homicidal ideation, symptoms of keenan or psychosis at this time. She is eating and sleeping well.  Endorses good symptom control with Adderall XR. Denies any side effects from the medications. Denies any other questions or concerns at this time.    06/03/2025  Patient states that  she has been doing well overall. 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 She continues to feel that her mood is stable and anxiety is appropriate for her situation, well managed with her coping mechanisms. She is eating and sleeping well. She denies suicidal ideation, homicidal ideation, symptoms of keenan or psychosis at this time. She continues to endorse good symptom control of her ADHD symptoms with Adderall XR. She denies any side effects from the medication and desires to continue her regimen without changes at this time.     Review of Systems   Medical ROS: See HPI    Past Medical, Family and Social History: The patient's past medical, family and social history have been reviewed and updated as appropriate within the electronic medical record - see encounter notes.    Current Meds: Adderall 20 mg XR   Compliance: yes    Previous Medications/Side effects   - Straterra: caused constipation     Risk Parameters:  Patient reports no suicidal ideation  Patient reports no homicidal ideation  Patient reports no self-injurious behavior  Patient reports no violent behavior    Exam (detailed: at least 9 elements; comprehensive: all 15 elements)   Constitutional  Vitals:  Most recent vital signs, dated less than 90 days prior to this appointment, were reviewed.   There were no vitals filed for this visit.           General:  unremarkable, age appropriate     Musculoskeletal  Muscle Strength/Tone:  not examined   Gait & Station:  not examined     Psychiatric  Speech:  no latency; no press   Mood & Affect:  euthymic  congruent and appropriate   Thought Process:  normal and logical   Associations:  intact   Thought Content:  no suicidality, no homicidality, delusions, or paranoia   Insight:  intact, has awareness of illness   Judgement: behavior is adequate to circumstances   Orientation:  grossly intact   Memory: intact for content of  interview   Language: grossly intact   Attention Span & Concentration:  able to focus   Fund of Knowledge:  intact and appropriate to age and level of education     Assessment and Diagnosis   19 y.o. female with ADHD. Patient stable on current regimen.       General Impression:    ICD-10-CM ICD-9-CM   1. Attention deficit hyperactivity disorder (ADHD), unspecified ADHD type  F90.9 314.01         Intervention/Counseling/Treatment Plan   Psych Meds:  Continue Adderall XR 20 mg PO Daily, [facility] Reviewed, (filled #30 2/26/25, 1/20/25, 2/26/25, 3/26/25, 5/12/25)    Discussed below risks of stimulant with patient    Increased risk for heart problems, high blood pressure, and sudden cardiac death.   Decreased appetite. People seem to be less hungry during the middle of the day, but they are often hungry by dinnertime as the medication wears off.   Sleep problems. If a person cannot fall asleep, the doctor may prescribe a lower dose. The doctor might also suggest that the medication is taken earlier in the day, or stop the afternoon or evening dose.    Stomach aches and headaches.   Tics. A few people develop sudden, repetitive movements or sounds called tics. These tics may or may not be noticeable. Changing the medication dosage may make tics go away. Some people also may appear to have a personality change, such as appearing flat or without emotion. Talk with your doctor if you see any of these side effects.  Stimulant medications may lead to drug abuse or dependence, but the risk is highest for those patient taking this class of medication who do not have ADHD. Research shows that teens with ADHD who took stimulant medications were less likely to abuse drugs than those who did not take stimulant medications. Proper diagnosis is the key to minimizing this risk.  Discussed with female patients that they will need to be off of stimulant if trying to become pregnant or become pregnant.    Instructions:  Take all  medications as prescribed.    Abstain from recreational drugs and alcohol.  Present to ED or call 911 for SI/HI plan or intent, psychosis, or medical emergency.      Return to Clinic: 3 months, discussed resident transition, patient should call in July to establish care with new provider to prevent lapses in medication    Mac Peters MD  [facility], PGY-III

## 2025-06-04 RX ORDER — DEXTROAMPHETAMINE SACCHARATE, AMPHETAMINE ASPARTATE MONOHYDRATE, DEXTROAMPHETAMINE SULFATE AND AMPHETAMINE SULFATE 5; 5; 5; 5 MG/1; MG/1; MG/1; MG/1
20 CAPSULE, EXTENDED RELEASE ORAL DAILY
Qty: 30 CAPSULE | Refills: 0 | Status: SHIPPED | OUTPATIENT
Start: 2025-06-11

## 2025-06-04 RX ORDER — DEXTROAMPHETAMINE SACCHARATE, AMPHETAMINE ASPARTATE MONOHYDRATE, DEXTROAMPHETAMINE SULFATE AND AMPHETAMINE SULFATE 5; 5; 5; 5 MG/1; MG/1; MG/1; MG/1
20 CAPSULE, EXTENDED RELEASE ORAL DAILY
Qty: 30 CAPSULE | Refills: 0 | Status: SHIPPED | OUTPATIENT
Start: 2025-08-10

## 2025-09-03 DIAGNOSIS — F90.9 ATTENTION DEFICIT HYPERACTIVITY DISORDER (ADHD), UNSPECIFIED ADHD TYPE: ICD-10-CM

## 2025-09-03 RX ORDER — DEXTROAMPHETAMINE SACCHARATE, AMPHETAMINE ASPARTATE MONOHYDRATE, DEXTROAMPHETAMINE SULFATE AND AMPHETAMINE SULFATE 5; 5; 5; 5 MG/1; MG/1; MG/1; MG/1
20 CAPSULE, EXTENDED RELEASE ORAL DAILY
Qty: 30 CAPSULE | Refills: 0 | Status: SHIPPED | OUTPATIENT
Start: 2025-09-03